# Patient Record
Sex: MALE | Race: BLACK OR AFRICAN AMERICAN | Employment: FULL TIME | URBAN - METROPOLITAN AREA
[De-identification: names, ages, dates, MRNs, and addresses within clinical notes are randomized per-mention and may not be internally consistent; named-entity substitution may affect disease eponyms.]

---

## 2017-03-28 ENCOUNTER — ED HISTORICAL/CONVERTED ENCOUNTER (OUTPATIENT)
Dept: OTHER | Age: 47
End: 2017-03-28

## 2019-04-16 ENCOUNTER — HOSPITAL ENCOUNTER (OUTPATIENT)
Dept: PREADMISSION TESTING | Age: 49
Discharge: HOME | End: 2019-04-16
Attending: ORTHOPAEDIC SURGERY
Payer: COMMERCIAL

## 2019-04-16 LAB
HCT VFR BLD AUTO: 40 % (ref 36–48)
HGB BLD-MCNC: 13.3 G/DL (ref 13–16)
POTASSIUM SERPL-SCNC: 3.8 MMOL/L (ref 3.5–5.5)

## 2019-04-16 PROCEDURE — 84132 ASSAY OF SERUM POTASSIUM: CPT

## 2019-04-16 PROCEDURE — 85018 HEMOGLOBIN: CPT

## 2019-04-16 PROCEDURE — 36415 COLL VENOUS BLD VENIPUNCTURE: CPT

## 2019-04-17 LAB
FAX TO INFO,FAXT: NORMAL
FAX TO NUMBER,FAXN: NORMAL

## 2022-06-09 ENCOUNTER — HOSPITAL ENCOUNTER (EMERGENCY)
Age: 52
Discharge: HOME | End: 2022-06-09
Attending: STUDENT IN AN ORGANIZED HEALTH CARE EDUCATION/TRAINING PROGRAM
Payer: COMMERCIAL

## 2022-06-09 VITALS
RESPIRATION RATE: 15 BRPM | HEART RATE: 92 BPM | WEIGHT: 204 LBS | SYSTOLIC BLOOD PRESSURE: 140 MMHG | TEMPERATURE: 97.7 F | BODY MASS INDEX: 29.27 KG/M2 | DIASTOLIC BLOOD PRESSURE: 95 MMHG | OXYGEN SATURATION: 96 %

## 2022-06-09 DIAGNOSIS — K02.9 PAIN DUE TO DENTAL CARIES: Primary | ICD-10-CM

## 2022-06-09 PROCEDURE — 99283 EMERGENCY DEPT VISIT LOW MDM: CPT

## 2022-06-09 RX ORDER — LIDOCAINE HYDROCHLORIDE 20 MG/ML
5 SOLUTION OROPHARYNGEAL
Qty: 30 ML | Refills: 0 | Status: SHIPPED | OUTPATIENT
Start: 2022-06-09

## 2022-06-09 RX ORDER — CLINDAMYCIN HYDROCHLORIDE 300 MG/1
300 CAPSULE ORAL 4 TIMES DAILY
Qty: 28 CAPSULE | Refills: 0 | Status: SHIPPED | OUTPATIENT
Start: 2022-06-09 | End: 2022-06-16

## 2022-06-09 NOTE — ED PROVIDER NOTES
EMERGENCY DEPARTMENT HISTORY AND PHYSICAL EXAM    Date: 6/9/2022  Patient Name: Catrachito Ricardo    History of Presenting Illness     Chief Complaint   Patient presents with    Dental Pain         History Provided By: Patient    12:35 PM  Catrachito Ricardo is a 49 y.o. male with PMHX of hypertension, CHF who presents to the emergency department C/O right upper dental pain which began a few days ago but became more severe last night and today. Patient states he has numerous cavities, does not currently have a dentist or dental insurance. Pt denies fever, dental injury or trauma, facial swelling, sore throat, neck pain, and any other sxs or complaints. PCP: Lena, MD Madhavi    Current Outpatient Medications   Medication Sig Dispense Refill    clindamycin (CLEOCIN) 300 mg capsule Take 1 Capsule by mouth four (4) times daily for 7 days. 28 Capsule 0    lidocaine (Lidocaine Viscous) 2 % solution Take 5 mL by mouth four (4) times daily as needed for Pain. Mix with equal parts water. Swish and swallow. 30 mL 0    cloNIDine (CATAPRES) 0.3 mg tablet Take 1 Tab by mouth two (2) times a day. 60 Tab 2    lisinopril (PRINIVIL, ZESTRIL) 20 mg tablet Take 1 Tab by mouth daily. 30 Tab 2    sertraline (ZOLOFT) 50 mg tablet Take 1 Tab by mouth daily. 30 Tab 0    metFORMIN (GLUCOPHAGE) 1,000 mg tablet Take 1,000 mg by mouth two (2) times daily (with meals).  MAGNESIUM OXIDE PO Take  by mouth.  hydrALAZINE (APRESOLINE) 50 mg tablet Take 1 Tab by mouth daily. 30 Tab 0    carvedilol (COREG) 25 mg tablet Take 1 Tab by mouth two (2) times daily (with meals). 60 Tab 0    furosemide (LASIX) 40 mg tablet Take 1 Tab by mouth daily. 30 Tab 0    amLODIPine (NORVASC) 10 mg tablet Take 1 Tab by mouth daily. 30 Tab 0    potassium chloride (KLOR-CON M20) 20 mEq tablet Take 1 Tab by mouth daily. 30 Tab 1    aspirin 81 mg tablet Take  by mouth two (2) times a day.          Past History     Past Medical History:  Past Medical History:   Diagnosis Date    Congestive heart failure, unspecified     chronic    Diabetes ([address]     Dyspnea on exertion     Heart failure (HCC)     Hyperglycemia     Hypertension     moderately severe    Renal insufficiency        Past Surgical History:  History reviewed. No pertinent surgical history. Family History:  Family History   Problem Relation Age of Onset    Hypertension Other         family history - nos    Heart Disease Father     Diabetes Mother        Social History:  Social History     Tobacco Use    Smoking status: Current Every Day Smoker     Packs/day: 0.25    Smokeless tobacco: Not on file   Substance Use Topics    Alcohol use: Yes     Comment: socially    Drug use: Not on file       Allergies: Allergies   Allergen Reactions    Penicillin G Unknown (comments)         Review of Systems   Review of Systems   Constitutional: Negative for fever. HENT: Positive for dental problem. Negative for sore throat. Musculoskeletal: Negative for neck pain. All other systems reviewed and are negative. Physical Exam     Vitals:    06/09/22 1137   BP: (!) 140/95   Pulse: 92   Resp: 15   Temp: 97.7 °F (36.5 °C)   SpO2: 96%   Weight: 92.5 kg (204 lb)     Physical Exam  Vital signs and nursing notes reviewed. CONSTITUTIONAL: Alert. Well-appearing; well-nourished; in no apparent distress. HEAD: Normocephalic; atraumatic. EYES: PERRL; EOM's intact. No nystagmus. Conjunctiva clear. ENT: TM's normal. External ear normal. Normal nose; no rhinorrhea. Normal pharynx. Tonsils not enlarged without exudate. Several decayed teeth, right posterior most molar has large posterior cavity as well as a missing molar. Generalized tenderness very slight lateral gum swelling, no overlying facial swelling, fluctuance or oral lesions. Floor mouth soft and not swollen. No difficulty controlling secretions. Moist mucus membranes.   NECK: Supple; FROM without difficulty, non-tender; no cervical lymphadenopathy. NEURO: A & O x3. PSYCH:  Mood and affect appropriate. Diagnostic Study Results     Labs -   No results found for this or any previous visit (from the past 12 hour(s)). Radiologic Studies -   No orders to display     CT Results  (Last 48 hours)    None        CXR Results  (Last 48 hours)    None          Medications given in the ED-  Medications - No data to display      Medical Decision Making   I am the first provider for this patient. I reviewed the vital signs, available nursing notes, past medical history, past surgical history, family history and social history. Vital Signs-Reviewed the patient's vital signs. Records Reviewed: Nursing Notes      Procedures:  Procedures    ED Course:  12:35 PM   Initial assessment performed. The patients presenting problems have been discussed, and they are in agreement with the care plan formulated and outlined with them. I have encouraged them to ask questions as they arise throughout their visit. Provider Notes (Medical Decision Making): Maya Cloud is a 49 y.o. male presents with painful dental caries and probably early infection. No abscess or other complication. Allergic to penicillin so we will treat with clindamycin, viscous lidocaine and recommend extra strength Tylenol. List of dental clinics provided for follow-up. Diagnosis and Disposition       DISCHARGE NOTE:    Bernardo Hoff  results have been reviewed with him. He has been counseled regarding his diagnosis, treatment, and plan. He verbally conveys understanding and agreement of the signs, symptoms, diagnosis, treatment and prognosis and additionally agrees to follow up as discussed. He also agrees with the care-plan and conveys that all of his questions have been answered.   I have also provided discharge instructions for him that include: educational information regarding their diagnosis and treatment, and list of reasons why they would want to return to the ED prior to their follow-up appointment, should his condition change. He has been provided with education for proper emergency department utilization. CLINICAL IMPRESSION:    1. Pain due to dental caries        PLAN:  1. D/C Home  2. Current Discharge Medication List      START taking these medications    Details   clindamycin (CLEOCIN) 300 mg capsule Take 1 Capsule by mouth four (4) times daily for 7 days. Qty: 28 Capsule, Refills: 0  Start date: 6/9/2022, End date: 6/16/2022      lidocaine (Lidocaine Viscous) 2 % solution Take 5 mL by mouth four (4) times daily as needed for Pain. Mix with equal parts water. Swish and swallow. Qty: 30 mL, Refills: 0  Start date: 6/9/2022           3. Follow-up Information     Follow up With Specialties Details Why Contact Info    See attached dental clinic list  Schedule an appointment as soon as possible for a visit       [facility] EMERGENCY DEPT Emergency Medicine  As needed, If symptoms worsen [address] [address]  [phone]        _______________________________      Please note that this dictation was completed with righTune, the computer voice recognition software. Quite often unanticipated grammatical, syntax, homophones, and other interpretive errors are inadvertently transcribed by the computer software. Please disregard these errors. Please excuse any errors that have escaped final proofreading.

## 2022-06-09 NOTE — DISCHARGE INSTRUCTIONS
[facility] [address] [place], [facility] 159  [address]:  [address] [facility], [address]  [phone]  Shahram Timmy, and Extractions    Old  [place]  [address] [facility], [address] [place]  [phone]  Shahram Timmy, and Extractions    PapiFormerly named [facility]  [address] [facility] 159  [phone]  Fillings, Cleaning, and [address] [place]  [address] [place], [address] [facility]  [phone]    KESHA ALICIA [facility] Department  [address], [address]  [phone]  Ages 3-18, if attending [facility]  [address] [facility], [address] [facility] [address] [facility] [address]  [phone]  Extractions, Yovany Lee, [facility]    [facility] of [address] [facility] [address], [address] [address] [facility] [address]  [phone]  Oral Surgery - $70 required  Eileen Kirkland, Extractions - $100 Required    [address], [address]  [phone]  [facility] Only    [address]  [place], [address] [place] [address]  Dental Clinic Open September to June

## 2024-01-08 ENCOUNTER — APPOINTMENT (OUTPATIENT)
Facility: HOSPITAL | Age: 54
DRG: 286 | End: 2024-01-08
Payer: MEDICAID

## 2024-01-08 ENCOUNTER — HOSPITAL ENCOUNTER (INPATIENT)
Facility: HOSPITAL | Age: 54
LOS: 9 days | Discharge: HOME | DRG: 286 | End: 2024-01-17
Attending: HOSPITALIST | Admitting: HOSPITALIST
Payer: MEDICAID

## 2024-01-08 DIAGNOSIS — I27.20 PULMONARY HYPERTENSION (HCC): ICD-10-CM

## 2024-01-08 DIAGNOSIS — I50.9 ACUTE CHF (CONGESTIVE HEART FAILURE) (HCC): ICD-10-CM

## 2024-01-08 DIAGNOSIS — I50.41 ACUTE COMBINED SYSTOLIC AND DIASTOLIC CHF, NYHA CLASS 1 (HCC): ICD-10-CM

## 2024-01-08 DIAGNOSIS — R06.02 SHORTNESS OF BREATH: Primary | ICD-10-CM

## 2024-01-08 DIAGNOSIS — I42.9 CARDIOMYOPATHY (HCC): ICD-10-CM

## 2024-01-08 DIAGNOSIS — I34.0 MITRAL REGURGITATION: ICD-10-CM

## 2024-01-08 DIAGNOSIS — R09.89 SUSPECTED PULMONARY HYPERTENSION: ICD-10-CM

## 2024-01-08 PROBLEM — R06.09 DOE (DYSPNEA ON EXERTION): Status: ACTIVE | Noted: 2024-01-08

## 2024-01-08 PROBLEM — Z72.0 TOBACCO USE: Status: ACTIVE | Noted: 2024-01-08

## 2024-01-08 PROBLEM — Z79.01 CHRONIC ANTICOAGULATION: Status: ACTIVE | Noted: 2024-01-08

## 2024-01-08 LAB
ALBUMIN SERPL-MCNC: 2.9 G/DL (ref 3.4–5)
ALBUMIN/GLOB SERPL: 0.9 (ref 0.8–1.7)
ALP SERPL-CCNC: 88 U/L (ref 45–117)
ALT SERPL-CCNC: 15 U/L (ref 16–61)
AMPHET UR QL SCN: NEGATIVE
ANION GAP SERPL CALC-SCNC: 8 MMOL/L (ref 3–18)
APTT PPP: 33.7 SEC (ref 23–36.4)
AST SERPL-CCNC: 11 U/L (ref 10–38)
BARBITURATES UR QL SCN: NEGATIVE
BASOPHILS # BLD: 0.1 K/UL (ref 0–0.1)
BASOPHILS NFR BLD: 1 % (ref 0–2)
BENZODIAZ UR QL: NEGATIVE
BILIRUB SERPL-MCNC: 1.2 MG/DL (ref 0.2–1)
BUN SERPL-MCNC: 21 MG/DL (ref 7–18)
BUN/CREAT SERPL: 12 (ref 12–20)
CALCIUM SERPL-MCNC: 7.9 MG/DL (ref 8.5–10.1)
CANNABINOIDS UR QL SCN: NEGATIVE
CHLORIDE SERPL-SCNC: 108 MMOL/L (ref 100–111)
CO2 SERPL-SCNC: 24 MMOL/L (ref 21–32)
COCAINE UR QL SCN: NEGATIVE
CREAT SERPL-MCNC: 1.79 MG/DL (ref 0.6–1.3)
D DIMER PPP FEU-MCNC: 0.34 UG/ML(FEU)
DIFFERENTIAL METHOD BLD: ABNORMAL
EKG ATRIAL RATE: 84 BPM
EKG ATRIAL RATE: 90 BPM
EKG DIAGNOSIS: NORMAL
EKG DIAGNOSIS: NORMAL
EKG P AXIS: 53 DEGREES
EKG P AXIS: 53 DEGREES
EKG P-R INTERVAL: 180 MS
EKG P-R INTERVAL: 180 MS
EKG Q-T INTERVAL: 402 MS
EKG Q-T INTERVAL: 410 MS
EKG QRS DURATION: 118 MS
EKG QRS DURATION: 120 MS
EKG QTC CALCULATION (BAZETT): 484 MS
EKG QTC CALCULATION (BAZETT): 491 MS
EKG R AXIS: -85 DEGREES
EKG R AXIS: 234 DEGREES
EKG T AXIS: 104 DEGREES
EKG T AXIS: 108 DEGREES
EKG VENTRICULAR RATE: 84 BPM
EKG VENTRICULAR RATE: 90 BPM
EOSINOPHIL # BLD: 0.3 K/UL (ref 0–0.4)
EOSINOPHIL NFR BLD: 3 % (ref 0–5)
ERYTHROCYTE [DISTWIDTH] IN BLOOD BY AUTOMATED COUNT: 16 % (ref 11.6–14.5)
GLOBULIN SER CALC-MCNC: 3.4 G/DL (ref 2–4)
GLUCOSE BLD STRIP.AUTO-MCNC: 102 MG/DL (ref 70–110)
GLUCOSE SERPL-MCNC: 145 MG/DL (ref 74–99)
HCT VFR BLD AUTO: 37.1 % (ref 36–48)
HGB BLD-MCNC: 11.8 G/DL (ref 13–16)
IMM GRANULOCYTES # BLD AUTO: 0 K/UL (ref 0–0.04)
IMM GRANULOCYTES NFR BLD AUTO: 0 % (ref 0–0.5)
INR PPP: 1.4 (ref 0.9–1.1)
LYMPHOCYTES # BLD: 1.2 K/UL (ref 0.9–3.6)
LYMPHOCYTES NFR BLD: 16 % (ref 21–52)
Lab: NORMAL
MCH RBC QN AUTO: 25.8 PG (ref 24–34)
MCHC RBC AUTO-ENTMCNC: 31.8 G/DL (ref 31–37)
MCV RBC AUTO: 81 FL (ref 78–100)
METHADONE UR QL: NEGATIVE
MONOCYTES # BLD: 0.7 K/UL (ref 0.05–1.2)
MONOCYTES NFR BLD: 9 % (ref 3–10)
NEUTS SEG # BLD: 5.6 K/UL (ref 1.8–8)
NEUTS SEG NFR BLD: 71 % (ref 40–73)
NRBC # BLD: 0 K/UL (ref 0–0.01)
NRBC BLD-RTO: 0 PER 100 WBC
NT PRO BNP: 2655 PG/ML (ref 0–900)
OPIATES UR QL: NEGATIVE
PCP UR QL: NEGATIVE
PLATELET # BLD AUTO: 248 K/UL (ref 135–420)
PMV BLD AUTO: 9.4 FL (ref 9.2–11.8)
POTASSIUM SERPL-SCNC: 3.3 MMOL/L (ref 3.5–5.5)
PROT SERPL-MCNC: 6.3 G/DL (ref 6.4–8.2)
PROTHROMBIN TIME: 17.1 SEC (ref 11.9–14.7)
RBC # BLD AUTO: 4.58 M/UL (ref 4.35–5.65)
SODIUM SERPL-SCNC: 140 MMOL/L (ref 136–145)
TROPONIN I SERPL HS-MCNC: 135 NG/L (ref 0–78)
TROPONIN I SERPL HS-MCNC: 139 NG/L (ref 0–78)
WBC # BLD AUTO: 7.9 K/UL (ref 4.6–13.2)

## 2024-01-08 PROCEDURE — 85610 PROTHROMBIN TIME: CPT

## 2024-01-08 PROCEDURE — 96375 TX/PRO/DX INJ NEW DRUG ADDON: CPT

## 2024-01-08 PROCEDURE — 85730 THROMBOPLASTIN TIME PARTIAL: CPT

## 2024-01-08 PROCEDURE — 80307 DRUG TEST PRSMV CHEM ANLYZR: CPT

## 2024-01-08 PROCEDURE — 83735 ASSAY OF MAGNESIUM: CPT

## 2024-01-08 PROCEDURE — 80053 COMPREHEN METABOLIC PANEL: CPT

## 2024-01-08 PROCEDURE — 6370000000 HC RX 637 (ALT 250 FOR IP): Performed by: NURSE PRACTITIONER

## 2024-01-08 PROCEDURE — 6360000002 HC RX W HCPCS: Performed by: NURSE PRACTITIONER

## 2024-01-08 PROCEDURE — 93005 ELECTROCARDIOGRAM TRACING: CPT | Performed by: NURSE PRACTITIONER

## 2024-01-08 PROCEDURE — 93005 ELECTROCARDIOGRAM TRACING: CPT | Performed by: HOSPITALIST

## 2024-01-08 PROCEDURE — 1100000000 HC RM PRIVATE

## 2024-01-08 PROCEDURE — 6360000002 HC RX W HCPCS: Performed by: HOSPITALIST

## 2024-01-08 PROCEDURE — 82962 GLUCOSE BLOOD TEST: CPT

## 2024-01-08 PROCEDURE — 84443 ASSAY THYROID STIM HORMONE: CPT

## 2024-01-08 PROCEDURE — 84484 ASSAY OF TROPONIN QUANT: CPT

## 2024-01-08 PROCEDURE — 87636 SARSCOV2 & INF A&B AMP PRB: CPT

## 2024-01-08 PROCEDURE — 85025 COMPLETE CBC W/AUTO DIFF WBC: CPT

## 2024-01-08 PROCEDURE — 6370000000 HC RX 637 (ALT 250 FOR IP): Performed by: HOSPITALIST

## 2024-01-08 PROCEDURE — 83880 ASSAY OF NATRIURETIC PEPTIDE: CPT

## 2024-01-08 PROCEDURE — 71046 X-RAY EXAM CHEST 2 VIEWS: CPT

## 2024-01-08 PROCEDURE — 85379 FIBRIN DEGRADATION QUANT: CPT

## 2024-01-08 PROCEDURE — 99285 EMERGENCY DEPT VISIT HI MDM: CPT

## 2024-01-08 PROCEDURE — A9540 TC99M MAA: HCPCS | Performed by: NURSE PRACTITIONER

## 2024-01-08 PROCEDURE — 3430000000 HC RX DIAGNOSTIC RADIOPHARMACEUTICAL: Performed by: NURSE PRACTITIONER

## 2024-01-08 PROCEDURE — 96374 THER/PROPH/DIAG INJ IV PUSH: CPT

## 2024-01-08 RX ORDER — HEPARIN SODIUM 10000 [USP'U]/100ML
5-30 INJECTION, SOLUTION INTRAVENOUS CONTINUOUS
Status: DISCONTINUED | OUTPATIENT
Start: 2024-01-08 | End: 2024-01-17 | Stop reason: HOSPADM

## 2024-01-08 RX ORDER — INSULIN LISPRO 100 [IU]/ML
0-4 INJECTION, SOLUTION INTRAVENOUS; SUBCUTANEOUS NIGHTLY
Status: DISCONTINUED | OUTPATIENT
Start: 2024-01-08 | End: 2024-01-17 | Stop reason: HOSPADM

## 2024-01-08 RX ORDER — FUROSEMIDE 10 MG/ML
20 INJECTION INTRAMUSCULAR; INTRAVENOUS 2 TIMES DAILY
Status: DISCONTINUED | OUTPATIENT
Start: 2024-01-08 | End: 2024-01-08

## 2024-01-08 RX ORDER — POTASSIUM CHLORIDE 20 MEQ/1
40 TABLET, EXTENDED RELEASE ORAL
Status: COMPLETED | OUTPATIENT
Start: 2024-01-08 | End: 2024-01-08

## 2024-01-08 RX ORDER — ACETAMINOPHEN 325 MG/1
650 TABLET ORAL EVERY 4 HOURS PRN
Status: DISCONTINUED | OUTPATIENT
Start: 2024-01-08 | End: 2024-01-17 | Stop reason: HOSPADM

## 2024-01-08 RX ORDER — HEPARIN SODIUM 1000 [USP'U]/ML
4000 INJECTION, SOLUTION INTRAVENOUS; SUBCUTANEOUS ONCE
Status: COMPLETED | OUTPATIENT
Start: 2024-01-08 | End: 2024-01-08

## 2024-01-08 RX ORDER — WARFARIN SODIUM 7.5 MG/1
7.5 TABLET ORAL
Status: COMPLETED | OUTPATIENT
Start: 2024-01-08 | End: 2024-01-08

## 2024-01-08 RX ORDER — FUROSEMIDE 10 MG/ML
20 INJECTION INTRAMUSCULAR; INTRAVENOUS DAILY
Status: DISCONTINUED | OUTPATIENT
Start: 2024-01-08 | End: 2024-01-09

## 2024-01-08 RX ORDER — HEPARIN SODIUM 1000 [USP'U]/ML
2000 INJECTION, SOLUTION INTRAVENOUS; SUBCUTANEOUS PRN
Status: DISCONTINUED | OUTPATIENT
Start: 2024-01-08 | End: 2024-01-17 | Stop reason: HOSPADM

## 2024-01-08 RX ORDER — CLONIDINE HYDROCHLORIDE 0.1 MG/1
0.3 TABLET ORAL 2 TIMES DAILY
Status: DISCONTINUED | OUTPATIENT
Start: 2024-01-08 | End: 2024-01-17

## 2024-01-08 RX ORDER — HYDRALAZINE HYDROCHLORIDE 50 MG/1
50 TABLET, FILM COATED ORAL DAILY
Status: DISCONTINUED | OUTPATIENT
Start: 2024-01-08 | End: 2024-01-11

## 2024-01-08 RX ORDER — POTASSIUM CHLORIDE 20 MEQ/1
20 TABLET, EXTENDED RELEASE ORAL DAILY
Status: DISCONTINUED | OUTPATIENT
Start: 2024-01-08 | End: 2024-01-17 | Stop reason: HOSPADM

## 2024-01-08 RX ORDER — CARVEDILOL 12.5 MG/1
25 TABLET ORAL 2 TIMES DAILY WITH MEALS
Status: DISCONTINUED | OUTPATIENT
Start: 2024-01-08 | End: 2024-01-17 | Stop reason: HOSPADM

## 2024-01-08 RX ORDER — INSULIN LISPRO 100 [IU]/ML
0-8 INJECTION, SOLUTION INTRAVENOUS; SUBCUTANEOUS
Status: DISCONTINUED | OUTPATIENT
Start: 2024-01-08 | End: 2024-01-17 | Stop reason: HOSPADM

## 2024-01-08 RX ORDER — HEPARIN SODIUM 1000 [USP'U]/ML
4000 INJECTION, SOLUTION INTRAVENOUS; SUBCUTANEOUS PRN
Status: DISCONTINUED | OUTPATIENT
Start: 2024-01-08 | End: 2024-01-17 | Stop reason: HOSPADM

## 2024-01-08 RX ORDER — AMLODIPINE BESYLATE 5 MG/1
10 TABLET ORAL DAILY
Status: DISCONTINUED | OUTPATIENT
Start: 2024-01-08 | End: 2024-01-17

## 2024-01-08 RX ORDER — ONDANSETRON 2 MG/ML
4 INJECTION INTRAMUSCULAR; INTRAVENOUS EVERY 6 HOURS PRN
Status: DISCONTINUED | OUTPATIENT
Start: 2024-01-08 | End: 2024-01-17 | Stop reason: HOSPADM

## 2024-01-08 RX ADMIN — HEPARIN SODIUM 4000 UNITS: 1000 INJECTION INTRAVENOUS; SUBCUTANEOUS at 22:59

## 2024-01-08 RX ADMIN — POTASSIUM CHLORIDE 20 MEQ: 1500 TABLET, EXTENDED RELEASE ORAL at 19:26

## 2024-01-08 RX ADMIN — POTASSIUM CHLORIDE 40 MEQ: 1500 TABLET, EXTENDED RELEASE ORAL at 16:47

## 2024-01-08 RX ADMIN — CARVEDILOL 25 MG: 12.5 TABLET, FILM COATED ORAL at 19:27

## 2024-01-08 RX ADMIN — KIT FOR THE PREPARATION OF TECHNETIUM TC 99M ALBUMIN AGGREGATED 6.6 MILLICURIE: 2.5 INJECTION, POWDER, FOR SOLUTION INTRAVENOUS at 18:09

## 2024-01-08 RX ADMIN — AMLODIPINE BESYLATE 10 MG: 5 TABLET ORAL at 19:26

## 2024-01-08 RX ADMIN — HEPARIN SODIUM 10 UNITS/KG/HR: 10000 INJECTION, SOLUTION INTRAVENOUS at 18:45

## 2024-01-08 RX ADMIN — SERTRALINE HYDROCHLORIDE 50 MG: 50 TABLET ORAL at 19:27

## 2024-01-08 RX ADMIN — WARFARIN SODIUM 7.5 MG: 7.5 TABLET ORAL at 23:29

## 2024-01-08 RX ADMIN — FUROSEMIDE 20 MG: 10 INJECTION, SOLUTION INTRAMUSCULAR; INTRAVENOUS at 19:26

## 2024-01-08 RX ADMIN — HEPARIN SODIUM 4000 UNITS: 1000 INJECTION INTRAVENOUS; SUBCUTANEOUS at 18:45

## 2024-01-08 RX ADMIN — CLONIDINE HYDROCHLORIDE 0.3 MG: 0.1 TABLET ORAL at 23:29

## 2024-01-08 NOTE — H&P (VIEW-ONLY)
antihypertensive treatment  Check urine for toxicology screen  Management plan was discussed in detail with the patient and wife.  Thank you for allowing me to participate in the management of this pleasant patient.  Please feel free to contact me if you have any questions or concerns.          Signed By: BELINDA BARRETT MD     January 8, 2024

## 2024-01-08 NOTE — CONSULTS
[facility]      Patient: Benjamin Alcocer MRN: [id number]  SSN: [id number]    YOB: 1970  Age: 53 y.o.  Sex: male    Date of Consultation: 1/8/2024    Reason for Consultation: Shortness of breath and elevated troponin    HPI:  I was asked by Oj Calvo RN  to see this pleasant patient for shortness of breath.  Benjamin Alcocer is a 53-year-old gentleman with history of hypertensive heart disease, chronic systolic heart failure, LVH, history of LV thrombus Coumadin, hypertension, chronic renal insufficiency came to the hospital for symptoms of progressively worsening of shortness of breath from last couple of months. Patient is found to have elevated troponin and INR 1.4.     Patient denied any history of drug abuse.    According to the patient in the past he has sleep study done and that was negative   No symptoms of chest pain   No symptoms of orthopnea PND or ankle swelling   Blood pressure is suboptimally controlled   INR is suboptimal as well.  According to the patient he was on Jardiance and his insurance was not covering.             Past Medical History:   Diagnosis Date    Congestive heart failure, unspecified     chronic    Diabetes (HCC)     Dyspnea on exertion     Heart failure (HCC)     Hyperglycemia     Hypertension     moderately severe    Renal insufficiency      History reviewed. No pertinent surgical history.  Current Facility-Administered Medications   Medication Dose Route Frequency    heparin (porcine) injection 4,000 Units  4,000 Units IntraVENous Once    heparin (porcine) injection 4,000 Units  4,000 Units IntraVENous PRN    heparin (porcine) injection 2,000 Units  2,000 Units IntraVENous PRN    heparin 25,000 units in dextrose 5% 250 mL (premix) infusion  5-30 Units/kg/hr IntraVENous Continuous     Current Outpatient Medications   Medication Sig    MAGNESIUM OXIDE PO Take by mouth    amLODIPine (NORVASC) 10 MG tablet Take 10 mg by mouth daily    carvedilol (COREG) 25 MG

## 2024-01-08 NOTE — ED PROVIDER NOTES
[facility] EMERGENCY DEPT  EMERGENCY DEPARTMENT ENCOUNTER       Pt Name: Benjamin Alcocer  MRN: [id number]  Birthdate 1970  Date of evaluation: 1/8/2024  PCP: Kristofer Delgado DO  Note Started: 6:23 PM 1/8/24     CHIEF COMPLAINT       Chief Complaint   Patient presents with    Shortness of Breath        HISTORY OF PRESENT ILLNESS: 1 or more elements      History From: Patient  HPI Limitations: None  Chronic Conditions: Hypertension, CHF, diabetes, anticoagulated on Coumadin    Benjamin Alcocer is a 53 y.o. male who presents to ED c/o worsening shortness of breath over the last 2 months.  States that shortness of breath is intermittent.  It is significantly worse when he exerts himself.  He also complains of a mild cough that is nonproductive.      He denies any fever or chills, denies chest pain, denies leg swelling.    He is anticoagulated on Coumadin for a blood clot that he states was in his heart a few years back.  States he last saw his primary care physician 6 months ago.    Denies any sick contacts.  He is concerned that he is having worsening heart failure.  He does state that he is compliant with his medications.     Nursing Notes were all reviewed and agreed with or any disagreements were addressed in the HPI.      PHYSICAL EXAM      Vitals:    01/08/24 1252   BP: (!) 168/100   Pulse: 92   Resp: 22   Temp: 97.3 °F (36.3 °C)   SpO2: 98%   Weight: 91.2 kg (201 lb)   Height: 1.778 m (5' 10\")     Physical Exam  Constitutional:       Appearance: He is well-developed and normal weight.   Cardiovascular:      Rate and Rhythm: Normal rate and regular rhythm.   Pulmonary:      Effort: Pulmonary effort is normal.      Breath sounds: Normal breath sounds.   Abdominal:      General: Bowel sounds are normal.      Palpations: Abdomen is soft.   Musculoskeletal:         General: Normal range of motion.   Skin:     General: Skin is warm and dry.   Neurological:      Mental Status: He is alert.              EMERGENCY DEPARTMENT

## 2024-01-08 NOTE — ED TRIAGE NOTES
Difficulty breathing since November worse with exertion, Hx CHF, denies CP, Denies any nausea or vomiting Hx HTN took hi meds today  
21-Jul-2022 22:00

## 2024-01-09 ENCOUNTER — APPOINTMENT (OUTPATIENT)
Facility: HOSPITAL | Age: 54
DRG: 286 | End: 2024-01-09
Payer: MEDICAID

## 2024-01-09 LAB
ANION GAP SERPL CALC-SCNC: 7 MMOL/L (ref 3–18)
APTT PPP: 45.3 SEC (ref 23–36.4)
APTT PPP: 67.9 SEC (ref 23–36.4)
APTT PPP: 71.2 SEC (ref 23–36.4)
APTT PPP: 72.8 SEC (ref 23–36.4)
BASOPHILS # BLD: 0 K/UL (ref 0–0.1)
BASOPHILS NFR BLD: 1 % (ref 0–2)
BUN SERPL-MCNC: 22 MG/DL (ref 7–18)
BUN/CREAT SERPL: 13 (ref 12–20)
CALCIUM SERPL-MCNC: 8.2 MG/DL (ref 8.5–10.1)
CHLORIDE SERPL-SCNC: 110 MMOL/L (ref 100–111)
CO2 SERPL-SCNC: 24 MMOL/L (ref 21–32)
CREAT SERPL-MCNC: 1.64 MG/DL (ref 0.6–1.3)
DIFFERENTIAL METHOD BLD: ABNORMAL
ECHO AO ASC DIAM: 3.8 CM
ECHO AO ASCENDING AORTA INDEX: 1.82 CM/M2
ECHO AO ROOT DIAM: 3.4 CM
ECHO AO ROOT INDEX: 1.63 CM/M2
ECHO AV AREA PEAK VELOCITY: 2.8 CM2
ECHO AV AREA VTI: 2.9 CM2
ECHO AV AREA/BSA PEAK VELOCITY: 1.3 CM2/M2
ECHO AV AREA/BSA VTI: 1.4 CM2/M2
ECHO AV MEAN GRADIENT: 3 MMHG
ECHO AV MEAN VELOCITY: 0.8 M/S
ECHO AV PEAK GRADIENT: 6 MMHG
ECHO AV PEAK VELOCITY: 1.2 M/S
ECHO AV VELOCITY RATIO: 0.67
ECHO AV VTI: 18.9 CM
ECHO BSA: 2.12 M2
ECHO EST RA PRESSURE: 15 MMHG
ECHO IVC PROX: 3.1 CM
ECHO LA DIAMETER INDEX: 2.68 CM/M2
ECHO LA DIAMETER: 5.6 CM
ECHO LA TO AORTIC ROOT RATIO: 1.65
ECHO LA VOL A-L A2C: 105 ML (ref 18–58)
ECHO LA VOL A-L A4C: 95 ML (ref 18–58)
ECHO LA VOL BP: 103 ML (ref 18–58)
ECHO LA VOL MOD A2C: 102 ML (ref 18–58)
ECHO LA VOL MOD A4C: 93 ML (ref 18–58)
ECHO LA VOL/BSA BIPLANE: 49 ML/M2 (ref 16–34)
ECHO LA VOLUME AREA LENGTH: 106 ML
ECHO LA VOLUME INDEX A-L A2C: 50 ML/M2 (ref 16–34)
ECHO LA VOLUME INDEX A-L A4C: 45 ML/M2 (ref 16–34)
ECHO LA VOLUME INDEX AREA LENGTH: 51 ML/M2 (ref 16–34)
ECHO LA VOLUME INDEX MOD A2C: 49 ML/M2 (ref 16–34)
ECHO LA VOLUME INDEX MOD A4C: 44 ML/M2 (ref 16–34)
ECHO LV E' LATERAL VELOCITY: 6 CM/S
ECHO LV E' SEPTAL VELOCITY: 6 CM/S
ECHO LV EDV A2C: 277 ML
ECHO LV EDV A4C: 225 ML
ECHO LV EDV BP: 262 ML (ref 67–155)
ECHO LV EDV INDEX A4C: 108 ML/M2
ECHO LV EDV INDEX BP: 125 ML/M2
ECHO LV EDV NDEX A2C: 133 ML/M2
ECHO LV EJECTION FRACTION A2C: 17 %
ECHO LV EJECTION FRACTION A4C: 24 %
ECHO LV EJECTION FRACTION BIPLANE: 20 % (ref 55–100)
ECHO LV ESV A2C: 228 ML
ECHO LV ESV A4C: 170 ML
ECHO LV ESV BP: 209 ML (ref 22–58)
ECHO LV ESV INDEX A2C: 109 ML/M2
ECHO LV ESV INDEX A4C: 81 ML/M2
ECHO LV ESV INDEX BP: 100 ML/M2
ECHO LV FRACTIONAL SHORTENING: 7 % (ref 28–44)
ECHO LV INTERNAL DIMENSION DIASTOLE INDEX: 2.82 CM/M2
ECHO LV INTERNAL DIMENSION DIASTOLIC: 5.9 CM (ref 4.2–5.9)
ECHO LV INTERNAL DIMENSION SYSTOLIC INDEX: 2.63 CM/M2
ECHO LV INTERNAL DIMENSION SYSTOLIC: 5.5 CM
ECHO LV IVSD: 1.6 CM (ref 0.6–1)
ECHO LV MASS 2D: 477.6 G (ref 88–224)
ECHO LV MASS INDEX 2D: 228.5 G/M2 (ref 49–115)
ECHO LV POSTERIOR WALL DIASTOLIC: 1.7 CM (ref 0.6–1)
ECHO LV RELATIVE WALL THICKNESS RATIO: 0.58
ECHO LVOT AREA: 4.5 CM2
ECHO LVOT AV VTI INDEX: 0.67
ECHO LVOT DIAM: 2.4 CM
ECHO LVOT MEAN GRADIENT: 1 MMHG
ECHO LVOT PEAK GRADIENT: 2 MMHG
ECHO LVOT PEAK VELOCITY: 0.8 M/S
ECHO LVOT STROKE VOLUME INDEX: 27.5 ML/M2
ECHO LVOT SV: 57.4 ML
ECHO LVOT VTI: 12.7 CM
ECHO MV A VELOCITY: 0.32 M/S
ECHO MV AREA VTI: 2.7 CM2
ECHO MV E DECELERATION TIME (DT): 151.4 MS
ECHO MV E VELOCITY: 0.88 M/S
ECHO MV E/A RATIO: 2.75
ECHO MV E/E' LATERAL: 14.67
ECHO MV E/E' RATIO (AVERAGED): 14.67
ECHO MV LVOT VTI INDEX: 1.66
ECHO MV MAX VELOCITY: 1.1 M/S
ECHO MV MEAN GRADIENT: 2 MMHG
ECHO MV MEAN VELOCITY: 0.6 M/S
ECHO MV PEAK GRADIENT: 5 MMHG
ECHO MV VTI: 21.1 CM
ECHO PULMONARY ARTERY END DIASTOLIC PRESSURE: 27 MMHG
ECHO PV MAX VELOCITY: 1 M/S
ECHO PV PEAK GRADIENT: 4 MMHG
ECHO PV REGURGITANT MAX VELOCITY: 2.6 M/S
ECHO RA END SYSTOLIC VOLUME APICAL 4 CHAMBER INDEX BSA: 50 ML/M2
ECHO RA VOLUME: 104 ML
ECHO RIGHT VENTRICULAR SYSTOLIC PRESSURE (RVSP): 75 MMHG
ECHO RV FREE WALL PEAK S': 10 CM/S
ECHO RV INTERNAL DIMENSION: 4.2 CM
ECHO RV TAPSE: 1.2 CM (ref 1.7–?)
ECHO TV REGURGITANT MAX VELOCITY: 3.87 M/S
ECHO TV REGURGITANT PEAK GRADIENT: 60 MMHG
EOSINOPHIL # BLD: 0.2 K/UL (ref 0–0.4)
EOSINOPHIL NFR BLD: 4 % (ref 0–5)
ERYTHROCYTE [DISTWIDTH] IN BLOOD BY AUTOMATED COUNT: 15.9 % (ref 11.6–14.5)
FLUAV RNA SPEC QL NAA+PROBE: NOT DETECTED
FLUBV RNA SPEC QL NAA+PROBE: NOT DETECTED
GLUCOSE BLD STRIP.AUTO-MCNC: 120 MG/DL (ref 70–110)
GLUCOSE BLD STRIP.AUTO-MCNC: 134 MG/DL (ref 70–110)
GLUCOSE BLD STRIP.AUTO-MCNC: 181 MG/DL (ref 70–110)
GLUCOSE BLD STRIP.AUTO-MCNC: 186 MG/DL (ref 70–110)
GLUCOSE SERPL-MCNC: 136 MG/DL (ref 74–99)
HCT VFR BLD AUTO: 35.1 % (ref 36–48)
HGB BLD-MCNC: 11.2 G/DL (ref 13–16)
IMM GRANULOCYTES # BLD AUTO: 0 K/UL (ref 0–0.04)
IMM GRANULOCYTES NFR BLD AUTO: 0 % (ref 0–0.5)
INR PPP: 1.4 (ref 0.9–1.1)
LYMPHOCYTES # BLD: 1 K/UL (ref 0.9–3.6)
LYMPHOCYTES NFR BLD: 19 % (ref 21–52)
MAGNESIUM SERPL-MCNC: 1.4 MG/DL (ref 1.6–2.6)
MCH RBC QN AUTO: 25.7 PG (ref 24–34)
MCHC RBC AUTO-ENTMCNC: 31.9 G/DL (ref 31–37)
MCV RBC AUTO: 80.7 FL (ref 78–100)
MONOCYTES # BLD: 0.6 K/UL (ref 0.05–1.2)
MONOCYTES NFR BLD: 10 % (ref 3–10)
NEUTS SEG # BLD: 3.7 K/UL (ref 1.8–8)
NEUTS SEG NFR BLD: 67 % (ref 40–73)
NRBC # BLD: 0 K/UL (ref 0–0.01)
NRBC BLD-RTO: 0 PER 100 WBC
PLATELET # BLD AUTO: 233 K/UL (ref 135–420)
PMV BLD AUTO: 10.1 FL (ref 9.2–11.8)
POTASSIUM SERPL-SCNC: 3.6 MMOL/L (ref 3.5–5.5)
PROTHROMBIN TIME: 17.6 SEC (ref 11.9–14.7)
RBC # BLD AUTO: 4.35 M/UL (ref 4.35–5.65)
SARS-COV-2 RNA RESP QL NAA+PROBE: NOT DETECTED
SODIUM SERPL-SCNC: 141 MMOL/L (ref 136–145)
TROPONIN I SERPL HS-MCNC: 117 NG/L (ref 0–78)
TROPONIN I SERPL HS-MCNC: 127 NG/L (ref 0–78)
TROPONIN I SERPL HS-MCNC: 91 NG/L (ref 0–78)
TROPONIN I SERPL HS-MCNC: 93 NG/L (ref 0–78)
TSH SERPL DL<=0.05 MIU/L-ACNC: 5.95 UIU/ML (ref 0.36–3.74)
WBC # BLD AUTO: 5.6 K/UL (ref 4.6–13.2)

## 2024-01-09 PROCEDURE — 6360000004 HC RX CONTRAST MEDICATION: Performed by: HOSPITALIST

## 2024-01-09 PROCEDURE — 36415 COLL VENOUS BLD VENIPUNCTURE: CPT

## 2024-01-09 PROCEDURE — 85025 COMPLETE CBC W/AUTO DIFF WBC: CPT

## 2024-01-09 PROCEDURE — 6370000000 HC RX 637 (ALT 250 FOR IP): Performed by: HOSPITALIST

## 2024-01-09 PROCEDURE — 6360000002 HC RX W HCPCS: Performed by: NURSE PRACTITIONER

## 2024-01-09 PROCEDURE — C8929 TTE W OR WO FOL WCON,DOPPLER: HCPCS

## 2024-01-09 PROCEDURE — 6360000002 HC RX W HCPCS: Performed by: HOSPITALIST

## 2024-01-09 PROCEDURE — 1100000003 HC PRIVATE W/ TELEMETRY

## 2024-01-09 PROCEDURE — 85730 THROMBOPLASTIN TIME PARTIAL: CPT

## 2024-01-09 PROCEDURE — 80048 BASIC METABOLIC PNL TOTAL CA: CPT

## 2024-01-09 PROCEDURE — 82962 GLUCOSE BLOOD TEST: CPT

## 2024-01-09 PROCEDURE — 85610 PROTHROMBIN TIME: CPT

## 2024-01-09 PROCEDURE — 84484 ASSAY OF TROPONIN QUANT: CPT

## 2024-01-09 RX ORDER — WARFARIN SODIUM 7.5 MG/1
7.5 TABLET ORAL
Status: COMPLETED | OUTPATIENT
Start: 2024-01-09 | End: 2024-01-09

## 2024-01-09 RX ORDER — DEXTROSE MONOHYDRATE 100 MG/ML
INJECTION, SOLUTION INTRAVENOUS CONTINUOUS PRN
Status: DISCONTINUED | OUTPATIENT
Start: 2024-01-09 | End: 2024-01-17 | Stop reason: HOSPADM

## 2024-01-09 RX ORDER — FUROSEMIDE 10 MG/ML
40 INJECTION INTRAMUSCULAR; INTRAVENOUS DAILY
Status: DISCONTINUED | OUTPATIENT
Start: 2024-01-10 | End: 2024-01-10

## 2024-01-09 RX ORDER — ROSUVASTATIN CALCIUM 40 MG/1
40 TABLET, COATED ORAL DAILY
COMMUNITY

## 2024-01-09 RX ORDER — ISOSORBIDE MONONITRATE 60 MG/1
60 TABLET, EXTENDED RELEASE ORAL EVERY 12 HOURS SCHEDULED
Status: ON HOLD | COMMUNITY
Start: 2024-01-02 | End: 2024-01-17 | Stop reason: HOSPADM

## 2024-01-09 RX ORDER — MAGNESIUM SULFATE IN WATER 40 MG/ML
2000 INJECTION, SOLUTION INTRAVENOUS ONCE
Status: COMPLETED | OUTPATIENT
Start: 2024-01-09 | End: 2024-01-09

## 2024-01-09 RX ADMIN — CLONIDINE HYDROCHLORIDE 0.3 MG: 0.1 TABLET ORAL at 08:36

## 2024-01-09 RX ADMIN — CARVEDILOL 25 MG: 12.5 TABLET, FILM COATED ORAL at 08:36

## 2024-01-09 RX ADMIN — CLONIDINE HYDROCHLORIDE 0.3 MG: 0.1 TABLET ORAL at 21:34

## 2024-01-09 RX ADMIN — HEPARIN SODIUM 2000 UNITS: 1000 INJECTION INTRAVENOUS; SUBCUTANEOUS at 13:40

## 2024-01-09 RX ADMIN — HEPARIN SODIUM 15 UNITS/KG/HR: 10000 INJECTION, SOLUTION INTRAVENOUS at 13:41

## 2024-01-09 RX ADMIN — WARFARIN SODIUM 7.5 MG: 7.5 TABLET ORAL at 17:41

## 2024-01-09 RX ADMIN — HEPARIN SODIUM 2000 UNITS: 1000 INJECTION INTRAVENOUS; SUBCUTANEOUS at 20:06

## 2024-01-09 RX ADMIN — PERFLUTREN 1.5 ML: 6.52 INJECTION, SUSPENSION INTRAVENOUS at 09:38

## 2024-01-09 RX ADMIN — SERTRALINE HYDROCHLORIDE 50 MG: 50 TABLET ORAL at 08:36

## 2024-01-09 RX ADMIN — FUROSEMIDE 20 MG: 10 INJECTION, SOLUTION INTRAMUSCULAR; INTRAVENOUS at 08:41

## 2024-01-09 RX ADMIN — HEPARIN SODIUM 4000 UNITS: 1000 INJECTION INTRAVENOUS; SUBCUTANEOUS at 06:40

## 2024-01-09 RX ADMIN — MAGNESIUM SULFATE HEPTAHYDRATE 2000 MG: 40 INJECTION, SOLUTION INTRAVENOUS at 10:02

## 2024-01-09 RX ADMIN — CARVEDILOL 25 MG: 12.5 TABLET, FILM COATED ORAL at 17:41

## 2024-01-09 RX ADMIN — AMLODIPINE BESYLATE 10 MG: 5 TABLET ORAL at 08:35

## 2024-01-09 RX ADMIN — POTASSIUM CHLORIDE 20 MEQ: 1500 TABLET, EXTENDED RELEASE ORAL at 08:41

## 2024-01-09 ASSESSMENT — PAIN SCALES - GENERAL: PAINLEVEL_OUTOF10: 0

## 2024-01-09 NOTE — CARE COORDINATION
Case Management Assessment  Initial Evaluation    Date/Time of Evaluation: 1/9/2024 3:46 PM  Assessment Completed by: Gutierrez Hernandez RN    If patient is discharged prior to next notation, then this note serves as note for discharge by case management.    Patient Name: Benjamin Alcocer                   YOB: 1970  Diagnosis: Shortness of breath [R06.02]  Cardiomyopathy (HCC) [I42.9]                   Date / Time: 1/8/2024  2:47 PM    Patient Admission Status: Inpatient   Readmission Risk (Low < 19, Mod (19-27), High > 27): Readmission Risk Score: 13.2    Current PCP: Kristofer Delgado, DO  PCP verified by CM? Yes    Chart Reviewed: Yes      History Provided by: Patient  Patient Orientation: Alert and Oriented    Patient Cognition: Alert    Hospitalization in the last 30 days (Readmission):  No    If yes, Readmission Assessment in CM Navigator will be completed.    Advance Directives:      Code Status: Full Code   Patient's Primary Decision Maker is:        Discharge Planning:    Patient lives with: Spouse/Significant Other Type of Home: House  Primary Care Giver: Self  Patient Support Systems include: Spouse/Significant Other, Family Members   Current Financial resources: None  Current community resources: None  Current services prior to admission: None            Current DME:              Type of Home Care services:  None    ADLS  Prior functional level: Independent in ADLs/IADLs  Current functional level: Independent in ADLs/IADLs    PT AM-PAC:   /24  OT AM-PAC:   /24    Family can provide assistance at DC: Yes  Would you like Case Management to discuss the discharge plan with any other family members/significant others, and if so, who?    Plans to Return to Present Housing: Yes  Other Identified Issues/Barriers to RETURNING to current housing: None  Potential Assistance needed at discharge: N/A            Potential DME:    Patient expects to discharge to: House  Plan for transportation at discharge:

## 2024-01-09 NOTE — H&P
which has been repleted.  Troponin 135, repeat is 139.  LFTs are unremarkable.  Glucose is 145, hemoglobin is 11.8, white count and platelet count are normal.  INR is 1.4.  Chest x-ray shows cardiomegaly and atelectasis.  VQ scan result is still pending.    ASSESSMENT:  1.  Exertional dyspnea.  2.  Cardiomyopathy, chronic.  3.  Chronic kidney disease.  4.  Diabetes.  5.  Medical noncompliance.  6.  Anticoagulation, chronic.  7.  History of left ventricular thrombus.  8.  Diabetes mellitus.    PLAN:  Resume his home meds to include Norvasc, Coreg, Catapres, daily Lasix, hydralazine, I am going to put on hold for now to see how his blood pressure response because I am not sure he has been taking all his regular medications.  We will put him on Accu-Cheks before meals and at bedtime with sliding scale as needed, daily potassium repletion.  Continue his Zoloft.  Heparin drip has been ordered.  A VQ scan will be followed up.  Routine electrolytes in the morning.  Repeat his TSH level.  Echocardiogram tomorrow morning.  Further evaluation depending on those results as above, and a low-sodium diet in the meantime.  He is stable for admission to the telemetry unit for cardiac monitoring.  He understands the plan of care.  He is a Full Code.  Expected length of stay 3 days.      YVAN ESTRADA MD      [id number]  D:  01/08/2024 18:52  T:  01/08/2024 19:20  JOB #:  [id number]

## 2024-01-10 PROBLEM — I50.23 ACUTE ON CHRONIC SYSTOLIC (CONGESTIVE) HEART FAILURE (HCC): Status: ACTIVE | Noted: 2024-01-10

## 2024-01-10 PROBLEM — N18.30 CKD (CHRONIC KIDNEY DISEASE) STAGE 3, GFR 30-59 ML/MIN (HCC): Status: ACTIVE | Noted: 2024-01-10

## 2024-01-10 LAB
ANION GAP SERPL CALC-SCNC: 6 MMOL/L (ref 3–18)
APTT PPP: 75.7 SEC (ref 23–36.4)
APTT PPP: 80.1 SEC (ref 23–36.4)
BASOPHILS # BLD: 0 K/UL (ref 0–0.1)
BASOPHILS NFR BLD: 1 % (ref 0–2)
BUN SERPL-MCNC: 26 MG/DL (ref 7–18)
BUN/CREAT SERPL: 13 (ref 12–20)
CALCIUM SERPL-MCNC: 8.4 MG/DL (ref 8.5–10.1)
CHLORIDE SERPL-SCNC: 108 MMOL/L (ref 100–111)
CHOLEST SERPL-MCNC: 98 MG/DL
CO2 SERPL-SCNC: 25 MMOL/L (ref 21–32)
CREAT SERPL-MCNC: 1.94 MG/DL (ref 0.6–1.3)
DIFFERENTIAL METHOD BLD: ABNORMAL
EOSINOPHIL # BLD: 0.2 K/UL (ref 0–0.4)
EOSINOPHIL NFR BLD: 4 % (ref 0–5)
ERYTHROCYTE [DISTWIDTH] IN BLOOD BY AUTOMATED COUNT: 16 % (ref 11.6–14.5)
GLUCOSE BLD STRIP.AUTO-MCNC: 147 MG/DL (ref 70–110)
GLUCOSE BLD STRIP.AUTO-MCNC: 152 MG/DL (ref 70–110)
GLUCOSE BLD STRIP.AUTO-MCNC: 209 MG/DL (ref 70–110)
GLUCOSE BLD STRIP.AUTO-MCNC: 259 MG/DL (ref 70–110)
GLUCOSE SERPL-MCNC: 138 MG/DL (ref 74–99)
HBA1C MFR BLD: 6.4 % (ref 4.2–5.6)
HCT VFR BLD AUTO: 35.1 % (ref 36–48)
HDLC SERPL-MCNC: 33 MG/DL (ref 40–60)
HDLC SERPL: 3 (ref 0–5)
HGB BLD-MCNC: 11.3 G/DL (ref 13–16)
IMM GRANULOCYTES # BLD AUTO: 0.1 K/UL (ref 0–0.04)
IMM GRANULOCYTES NFR BLD AUTO: 1 % (ref 0–0.5)
INR PPP: 1.7 (ref 0.9–1.1)
LDLC SERPL CALC-MCNC: 49.4 MG/DL (ref 0–100)
LIPID PANEL: ABNORMAL
LYMPHOCYTES # BLD: 1.3 K/UL (ref 0.9–3.6)
LYMPHOCYTES NFR BLD: 23 % (ref 21–52)
MCH RBC QN AUTO: 25.8 PG (ref 24–34)
MCHC RBC AUTO-ENTMCNC: 32.2 G/DL (ref 31–37)
MCV RBC AUTO: 80.1 FL (ref 78–100)
MONOCYTES # BLD: 0.6 K/UL (ref 0.05–1.2)
MONOCYTES NFR BLD: 10 % (ref 3–10)
NEUTS SEG # BLD: 3.3 K/UL (ref 1.8–8)
NEUTS SEG NFR BLD: 61 % (ref 40–73)
NRBC # BLD: 0 K/UL (ref 0–0.01)
NRBC BLD-RTO: 0 PER 100 WBC
PLATELET # BLD AUTO: 223 K/UL (ref 135–420)
PMV BLD AUTO: 9.7 FL (ref 9.2–11.8)
POTASSIUM SERPL-SCNC: 3.6 MMOL/L (ref 3.5–5.5)
PROTHROMBIN TIME: 19.9 SEC (ref 11.9–14.7)
RBC # BLD AUTO: 4.38 M/UL (ref 4.35–5.65)
SODIUM SERPL-SCNC: 139 MMOL/L (ref 136–145)
TRIGL SERPL-MCNC: 78 MG/DL
VLDLC SERPL CALC-MCNC: 15.6 MG/DL
WBC # BLD AUTO: 5.5 K/UL (ref 4.6–13.2)

## 2024-01-10 PROCEDURE — 85610 PROTHROMBIN TIME: CPT

## 2024-01-10 PROCEDURE — 6370000000 HC RX 637 (ALT 250 FOR IP): Performed by: HOSPITALIST

## 2024-01-10 PROCEDURE — 82962 GLUCOSE BLOOD TEST: CPT

## 2024-01-10 PROCEDURE — 1100000003 HC PRIVATE W/ TELEMETRY

## 2024-01-10 PROCEDURE — 80061 LIPID PANEL: CPT

## 2024-01-10 PROCEDURE — 85025 COMPLETE CBC W/AUTO DIFF WBC: CPT

## 2024-01-10 PROCEDURE — 85730 THROMBOPLASTIN TIME PARTIAL: CPT

## 2024-01-10 PROCEDURE — 6360000002 HC RX W HCPCS: Performed by: INTERNAL MEDICINE

## 2024-01-10 PROCEDURE — 36415 COLL VENOUS BLD VENIPUNCTURE: CPT

## 2024-01-10 PROCEDURE — 80048 BASIC METABOLIC PNL TOTAL CA: CPT

## 2024-01-10 PROCEDURE — 83036 HEMOGLOBIN GLYCOSYLATED A1C: CPT

## 2024-01-10 RX ORDER — FUROSEMIDE 10 MG/ML
20 INJECTION INTRAMUSCULAR; INTRAVENOUS DAILY
Status: DISCONTINUED | OUTPATIENT
Start: 2024-01-11 | End: 2024-01-11

## 2024-01-10 RX ADMIN — FUROSEMIDE 40 MG: 10 INJECTION, SOLUTION INTRAMUSCULAR; INTRAVENOUS at 08:29

## 2024-01-10 RX ADMIN — CLONIDINE HYDROCHLORIDE 0.3 MG: 0.1 TABLET ORAL at 08:29

## 2024-01-10 RX ADMIN — CARVEDILOL 25 MG: 12.5 TABLET, FILM COATED ORAL at 08:29

## 2024-01-10 RX ADMIN — SERTRALINE HYDROCHLORIDE 50 MG: 50 TABLET ORAL at 08:29

## 2024-01-10 RX ADMIN — CARVEDILOL 25 MG: 12.5 TABLET, FILM COATED ORAL at 17:18

## 2024-01-10 RX ADMIN — CLONIDINE HYDROCHLORIDE 0.3 MG: 0.1 TABLET ORAL at 22:03

## 2024-01-10 RX ADMIN — AMLODIPINE BESYLATE 10 MG: 5 TABLET ORAL at 08:29

## 2024-01-10 RX ADMIN — POTASSIUM CHLORIDE 20 MEQ: 1500 TABLET, EXTENDED RELEASE ORAL at 08:29

## 2024-01-10 NOTE — PLAN OF CARE
Problem: Discharge Planning  Goal: Discharge to home or other facility with appropriate resources  Outcome: Progressing  Flowsheets (Taken 1/9/2024 2000)  Discharge to home or other facility with appropriate resources:   Identify barriers to discharge with patient and caregiver   Identify discharge learning needs (meds, wound care, etc)     Problem: Safety - Adult  Goal: Free from fall injury  Outcome: Progressing     Problem: Pain  Goal: Verbalizes/displays adequate comfort level or baseline comfort level  Outcome: Progressing  Flowsheets (Taken 1/9/2024 2000)  Verbalizes/displays adequate comfort level or baseline comfort level:   Encourage patient to monitor pain and request assistance   Assess pain using appropriate pain scale   Administer analgesics based on type and severity of pain and evaluate response   Implement non-pharmacological measures as appropriate and evaluate response

## 2024-01-11 PROBLEM — R09.89 SUSPECTED PULMONARY HYPERTENSION: Status: ACTIVE | Noted: 2024-01-08

## 2024-01-11 PROBLEM — I50.9 ACUTE CHF (CONGESTIVE HEART FAILURE) (HCC): Status: ACTIVE | Noted: 2024-01-08

## 2024-01-11 LAB
25(OH)D3 SERPL-MCNC: 11.1 NG/ML (ref 30–100)
ANION GAP SERPL CALC-SCNC: 8 MMOL/L (ref 3–18)
APTT PPP: 69.6 SEC (ref 23–36.4)
APTT PPP: 84.6 SEC (ref 23–36.4)
BASOPHILS # BLD: 0.1 K/UL (ref 0–0.1)
BASOPHILS NFR BLD: 1 % (ref 0–2)
BUN SERPL-MCNC: 29 MG/DL (ref 7–18)
BUN/CREAT SERPL: 14 (ref 12–20)
CALCIUM SERPL-MCNC: 8.4 MG/DL (ref 8.5–10.1)
CHLORIDE SERPL-SCNC: 109 MMOL/L (ref 100–111)
CO2 SERPL-SCNC: 23 MMOL/L (ref 21–32)
CREAT SERPL-MCNC: 2.04 MG/DL (ref 0.6–1.3)
DIFFERENTIAL METHOD BLD: ABNORMAL
EOSINOPHIL # BLD: 0.2 K/UL (ref 0–0.4)
EOSINOPHIL NFR BLD: 3 % (ref 0–5)
ERYTHROCYTE [DISTWIDTH] IN BLOOD BY AUTOMATED COUNT: 15.7 % (ref 11.6–14.5)
FERRITIN SERPL-MCNC: 77 NG/ML (ref 8–388)
GLUCOSE BLD STRIP.AUTO-MCNC: 101 MG/DL (ref 70–110)
GLUCOSE BLD STRIP.AUTO-MCNC: 138 MG/DL (ref 70–110)
GLUCOSE BLD STRIP.AUTO-MCNC: 208 MG/DL (ref 70–110)
GLUCOSE BLD STRIP.AUTO-MCNC: 257 MG/DL (ref 70–110)
GLUCOSE SERPL-MCNC: 129 MG/DL (ref 74–99)
HCT VFR BLD AUTO: 35.3 % (ref 36–48)
HGB BLD-MCNC: 11.4 G/DL (ref 13–16)
IMM GRANULOCYTES # BLD AUTO: 0 K/UL (ref 0–0.04)
IMM GRANULOCYTES NFR BLD AUTO: 0 % (ref 0–0.5)
INR PPP: 2 (ref 0.9–1.1)
IRON SATN MFR SERPL: 8 % (ref 20–50)
IRON SERPL-MCNC: 29 UG/DL (ref 50–175)
LYMPHOCYTES # BLD: 0.9 K/UL (ref 0.9–3.6)
LYMPHOCYTES NFR BLD: 20 % (ref 21–52)
MCH RBC QN AUTO: 25.6 PG (ref 24–34)
MCHC RBC AUTO-ENTMCNC: 32.3 G/DL (ref 31–37)
MCV RBC AUTO: 79.3 FL (ref 78–100)
MONOCYTES # BLD: 0.5 K/UL (ref 0.05–1.2)
MONOCYTES NFR BLD: 10 % (ref 3–10)
NEUTS SEG # BLD: 3.1 K/UL (ref 1.8–8)
NEUTS SEG NFR BLD: 66 % (ref 40–73)
NRBC # BLD: 0 K/UL (ref 0–0.01)
NRBC BLD-RTO: 0 PER 100 WBC
PHOSPHATE SERPL-MCNC: 3 MG/DL (ref 2.5–4.9)
PLATELET # BLD AUTO: 210 K/UL (ref 135–420)
PMV BLD AUTO: 10.2 FL (ref 9.2–11.8)
POTASSIUM SERPL-SCNC: 3.4 MMOL/L (ref 3.5–5.5)
PROTHROMBIN TIME: 22.5 SEC (ref 11.9–14.7)
RBC # BLD AUTO: 4.45 M/UL (ref 4.35–5.65)
SODIUM SERPL-SCNC: 140 MMOL/L (ref 136–145)
TIBC SERPL-MCNC: 349 UG/DL (ref 250–450)
WBC # BLD AUTO: 4.7 K/UL (ref 4.6–13.2)

## 2024-01-11 PROCEDURE — 6370000000 HC RX 637 (ALT 250 FOR IP): Performed by: HOSPITALIST

## 2024-01-11 PROCEDURE — 6360000002 HC RX W HCPCS: Performed by: NURSE PRACTITIONER

## 2024-01-11 PROCEDURE — 83970 ASSAY OF PARATHORMONE: CPT

## 2024-01-11 PROCEDURE — 84100 ASSAY OF PHOSPHORUS: CPT

## 2024-01-11 PROCEDURE — 82306 VITAMIN D 25 HYDROXY: CPT

## 2024-01-11 PROCEDURE — 6360000002 HC RX W HCPCS: Performed by: HOSPITALIST

## 2024-01-11 PROCEDURE — 1100000003 HC PRIVATE W/ TELEMETRY

## 2024-01-11 PROCEDURE — 80048 BASIC METABOLIC PNL TOTAL CA: CPT

## 2024-01-11 PROCEDURE — 83540 ASSAY OF IRON: CPT

## 2024-01-11 PROCEDURE — 36415 COLL VENOUS BLD VENIPUNCTURE: CPT

## 2024-01-11 PROCEDURE — 82728 ASSAY OF FERRITIN: CPT

## 2024-01-11 PROCEDURE — 85025 COMPLETE CBC W/AUTO DIFF WBC: CPT

## 2024-01-11 PROCEDURE — 82962 GLUCOSE BLOOD TEST: CPT

## 2024-01-11 PROCEDURE — 85610 PROTHROMBIN TIME: CPT

## 2024-01-11 PROCEDURE — 83550 IRON BINDING TEST: CPT

## 2024-01-11 PROCEDURE — 85730 THROMBOPLASTIN TIME PARTIAL: CPT

## 2024-01-11 RX ORDER — HYDRALAZINE HYDROCHLORIDE 50 MG/1
50 TABLET, FILM COATED ORAL EVERY 8 HOURS SCHEDULED
Status: DISCONTINUED | OUTPATIENT
Start: 2024-01-11 | End: 2024-01-17 | Stop reason: HOSPADM

## 2024-01-11 RX ADMIN — POTASSIUM CHLORIDE 20 MEQ: 1500 TABLET, EXTENDED RELEASE ORAL at 08:21

## 2024-01-11 RX ADMIN — SERTRALINE HYDROCHLORIDE 50 MG: 50 TABLET ORAL at 08:21

## 2024-01-11 RX ADMIN — AMLODIPINE BESYLATE 10 MG: 5 TABLET ORAL at 08:21

## 2024-01-11 RX ADMIN — HYDRALAZINE HYDROCHLORIDE 50 MG: 50 TABLET, FILM COATED ORAL at 13:14

## 2024-01-11 RX ADMIN — HYDRALAZINE HYDROCHLORIDE 50 MG: 50 TABLET, FILM COATED ORAL at 21:06

## 2024-01-11 RX ADMIN — CLONIDINE HYDROCHLORIDE 0.3 MG: 0.1 TABLET ORAL at 21:06

## 2024-01-11 RX ADMIN — HEPARIN SODIUM 2000 UNITS: 1000 INJECTION INTRAVENOUS; SUBCUTANEOUS at 08:19

## 2024-01-11 RX ADMIN — POTASSIUM BICARBONATE 20 MEQ: 782 TABLET, EFFERVESCENT ORAL at 13:14

## 2024-01-11 RX ADMIN — HEPARIN SODIUM 16 UNITS/KG/HR: 10000 INJECTION, SOLUTION INTRAVENOUS at 03:27

## 2024-01-11 RX ADMIN — CLONIDINE HYDROCHLORIDE 0.3 MG: 0.1 TABLET ORAL at 08:20

## 2024-01-11 RX ADMIN — CARVEDILOL 25 MG: 12.5 TABLET, FILM COATED ORAL at 17:31

## 2024-01-11 RX ADMIN — CARVEDILOL 25 MG: 12.5 TABLET, FILM COATED ORAL at 08:21

## 2024-01-11 RX ADMIN — FUROSEMIDE 20 MG: 10 INJECTION, SOLUTION INTRAMUSCULAR; INTRAVENOUS at 08:21

## 2024-01-11 ASSESSMENT — PAIN SCALES - GENERAL: PAINLEVEL_OUTOF10: 0

## 2024-01-11 NOTE — CONSULTS
RENAL CONSULT  2024    Patient:  Benjamin Alcocer  :  1970  Gender:  male  MRN #:  [id number]    Reason for Consult: management of CKD and CHF     History of Present Illness:    Benjamin Alcocer is a 53 y.o. year old male  with ongoing Hypertension, diabetes , CHF , CKD stage 3GB presented with progressive shortness of breath   Admitted for CHF , treated with lasix   ECHO showed very low EF  When I saw him this morning he was lying flat, denied shortness of breath and chest pain   Urinating fine , denied other symptoms           Past Medical History:   Diagnosis Date    Congestive heart failure, unspecified     chronic    Diabetes (HCC)     Dyspnea on exertion     Heart failure (HCC)     Hyperglycemia     Hypertension     moderately severe    Renal insufficiency      History reviewed. No pertinent surgical history.  Family History   Problem Relation Age of Onset    Heart Disease Father     Diabetes Mother     Hypertension Other         family history - nos     Allergies   Allergen Reactions    Penicillin G      Other reaction(s): Unknown (comments)     Current Facility-Administered Medications   Medication Dose Route Frequency Provider Last Rate Last Admin    hydrALAZINE (APRESOLINE) tablet 50 mg  50 mg Oral 3 times per day Marina Sterling MD        potassium bicarb-citric acid (EFFER-K) effervescent tablet 20 mEq  20 mEq Oral Once Marina Sterling MD        glucose chewable tablet 16 g  4 tablet Oral PRN Marina Sterling MD        dextrose bolus 10% 125 mL  125 mL IntraVENous PRN Marina Sterling MD        Or    dextrose bolus 10% 250 mL  250 mL IntraVENous PRN Marina Sterling MD        glucagon (rDNA) injection 1 mg  1 mg SubCUTAneous PRN Marina Sterling MD        dextrose 10 % infusion   IntraVENous Continuous PRN Marina Sterling MD        heparin (porcine) injection 4,000 Units  4,000 Units IntraVENous PRN Oj Calvo ACNP   4,000 Units at 24 0640    heparin (porcine) injection 2,000 Units  2,000 Units

## 2024-01-11 NOTE — PLAN OF CARE
Problem: Discharge Planning  Goal: Discharge to home or other facility with appropriate resources  Outcome: Progressing  Flowsheets (Taken 1/10/2024 2000)  Discharge to home or other facility with appropriate resources:   Identify barriers to discharge with patient and caregiver   Identify discharge learning needs (meds, wound care, etc)     Problem: Safety - Adult  Goal: Free from fall injury  Outcome: Progressing     Problem: Pain  Goal: Verbalizes/displays adequate comfort level or baseline comfort level  Outcome: Progressing  Flowsheets (Taken 1/10/2024 2000)  Verbalizes/displays adequate comfort level or baseline comfort level:   Encourage patient to monitor pain and request assistance   Assess pain using appropriate pain scale   Administer analgesics based on type and severity of pain and evaluate response   Implement non-pharmacological measures as appropriate and evaluate response      No significant past surgical history

## 2024-01-12 LAB
ANION GAP SERPL CALC-SCNC: 7 MMOL/L (ref 3–18)
APTT PPP: 84.5 SEC (ref 23–36.4)
APTT PPP: 84.8 SEC (ref 23–36.4)
BUN SERPL-MCNC: 32 MG/DL (ref 7–18)
BUN/CREAT SERPL: 15 (ref 12–20)
CALCIUM SERPL-MCNC: 8.2 MG/DL (ref 8.5–10.1)
CALCIUM SERPL-MCNC: 8.5 MG/DL (ref 8.5–10.1)
CHLORIDE SERPL-SCNC: 108 MMOL/L (ref 100–111)
CO2 SERPL-SCNC: 24 MMOL/L (ref 21–32)
CREAT SERPL-MCNC: 2.14 MG/DL (ref 0.6–1.3)
ERYTHROCYTE [DISTWIDTH] IN BLOOD BY AUTOMATED COUNT: 15.7 % (ref 11.6–14.5)
GLUCOSE BLD STRIP.AUTO-MCNC: 143 MG/DL (ref 70–110)
GLUCOSE BLD STRIP.AUTO-MCNC: 149 MG/DL (ref 70–110)
GLUCOSE BLD STRIP.AUTO-MCNC: 150 MG/DL (ref 70–110)
GLUCOSE BLD STRIP.AUTO-MCNC: 161 MG/DL (ref 70–110)
GLUCOSE SERPL-MCNC: 135 MG/DL (ref 74–99)
HCT VFR BLD AUTO: 33.2 % (ref 36–48)
HGB BLD-MCNC: 10.8 G/DL (ref 13–16)
INR PPP: 1.7 (ref 0.9–1.1)
MCH RBC QN AUTO: 25.8 PG (ref 24–34)
MCHC RBC AUTO-ENTMCNC: 32.5 G/DL (ref 31–37)
MCV RBC AUTO: 79.4 FL (ref 78–100)
NRBC # BLD: 0 K/UL (ref 0–0.01)
NRBC BLD-RTO: 0 PER 100 WBC
PLATELET # BLD AUTO: 202 K/UL (ref 135–420)
PMV BLD AUTO: 10.3 FL (ref 9.2–11.8)
POTASSIUM SERPL-SCNC: 3.5 MMOL/L (ref 3.5–5.5)
PROTHROMBIN TIME: 20.3 SEC (ref 11.9–14.7)
PTH-INTACT SERPL-MCNC: 204.6 PG/ML (ref 18.4–88)
RBC # BLD AUTO: 4.18 M/UL (ref 4.35–5.65)
SODIUM SERPL-SCNC: 139 MMOL/L (ref 136–145)
WBC # BLD AUTO: 5.1 K/UL (ref 4.6–13.2)

## 2024-01-12 PROCEDURE — 6360000002 HC RX W HCPCS: Performed by: NURSE PRACTITIONER

## 2024-01-12 PROCEDURE — 1100000003 HC PRIVATE W/ TELEMETRY

## 2024-01-12 PROCEDURE — 82962 GLUCOSE BLOOD TEST: CPT

## 2024-01-12 PROCEDURE — 85027 COMPLETE CBC AUTOMATED: CPT

## 2024-01-12 PROCEDURE — 85610 PROTHROMBIN TIME: CPT

## 2024-01-12 PROCEDURE — 80048 BASIC METABOLIC PNL TOTAL CA: CPT

## 2024-01-12 PROCEDURE — 85730 THROMBOPLASTIN TIME PARTIAL: CPT

## 2024-01-12 PROCEDURE — 6370000000 HC RX 637 (ALT 250 FOR IP): Performed by: HOSPITALIST

## 2024-01-12 PROCEDURE — 36415 COLL VENOUS BLD VENIPUNCTURE: CPT

## 2024-01-12 PROCEDURE — 2700000000 HC OXYGEN THERAPY PER DAY

## 2024-01-12 RX ADMIN — HYDRALAZINE HYDROCHLORIDE 50 MG: 50 TABLET, FILM COATED ORAL at 21:01

## 2024-01-12 RX ADMIN — SERTRALINE HYDROCHLORIDE 50 MG: 50 TABLET ORAL at 09:46

## 2024-01-12 RX ADMIN — HYDRALAZINE HYDROCHLORIDE 50 MG: 50 TABLET, FILM COATED ORAL at 17:50

## 2024-01-12 RX ADMIN — POTASSIUM CHLORIDE 20 MEQ: 1500 TABLET, EXTENDED RELEASE ORAL at 09:46

## 2024-01-12 RX ADMIN — CARVEDILOL 25 MG: 12.5 TABLET, FILM COATED ORAL at 09:46

## 2024-01-12 RX ADMIN — AMLODIPINE BESYLATE 10 MG: 5 TABLET ORAL at 09:46

## 2024-01-12 RX ADMIN — CLONIDINE HYDROCHLORIDE 0.3 MG: 0.1 TABLET ORAL at 21:01

## 2024-01-12 RX ADMIN — HYDRALAZINE HYDROCHLORIDE 50 MG: 50 TABLET, FILM COATED ORAL at 05:46

## 2024-01-12 RX ADMIN — CLONIDINE HYDROCHLORIDE 0.3 MG: 0.1 TABLET ORAL at 09:46

## 2024-01-12 RX ADMIN — CARVEDILOL 25 MG: 12.5 TABLET, FILM COATED ORAL at 17:50

## 2024-01-12 RX ADMIN — HEPARIN SODIUM 17 UNITS/KG/HR: 10000 INJECTION, SOLUTION INTRAVENOUS at 12:54

## 2024-01-12 ASSESSMENT — PAIN SCALES - GENERAL: PAINLEVEL_OUTOF10: 0

## 2024-01-12 NOTE — ACP (ADVANCE CARE PLANNING)
Advance Care Planning     Advance Care Planning Inpatient Note  [facility] Department    Today's Date: 1/12/2024  Unit: [address] CARDIAC/MEDICAL    Received request from rounding.  Upon review of chart and communication with care team, patient's decision making abilities are not in question.. Patient and Significant other  was/were present in the room during visit.    Goals of ACP Conversation:  Discuss advance care planning documents    Health Care Decision Makers:     No healthcare decision makers have been documented.  Click here to complete HealthCare Decision Makers including selection of the Healthcare Decision Maker Relationship (ie \"Primary\")  Summary:  No Decision Maker named by patient at this time              Assessment:  , Patient and his significant other were in room and discussing ACP documents and State Decision Maker hierarchy.  Patient was pleasant yet slightly out of breath.  He engaged in conversation about documents, however did not indicate he wanted to complete them at this time.    Interventions:  Provided education on documents for clarity and greater understanding  Discussed and provided education on state decision maker hierarchy    Care Preferences Communicated:   No    Outcomes/Plan:  ACP Discussion: Postponed    Electronically signed by Chaplain Isrrael on 1/12/2024 at 2:50 PM

## 2024-01-13 LAB
ANION GAP SERPL CALC-SCNC: 8 MMOL/L (ref 3–18)
APTT PPP: 114.9 SEC (ref 23–36.4)
APTT PPP: 58.2 SEC (ref 23–36.4)
APTT PPP: 71.6 SEC (ref 23–36.4)
BUN SERPL-MCNC: 29 MG/DL (ref 7–18)
BUN/CREAT SERPL: 14 (ref 12–20)
CALCIUM SERPL-MCNC: 8.6 MG/DL (ref 8.5–10.1)
CHLORIDE SERPL-SCNC: 109 MMOL/L (ref 100–111)
CO2 SERPL-SCNC: 24 MMOL/L (ref 21–32)
CREAT SERPL-MCNC: 2.09 MG/DL (ref 0.6–1.3)
GLUCOSE BLD STRIP.AUTO-MCNC: 127 MG/DL (ref 70–110)
GLUCOSE BLD STRIP.AUTO-MCNC: 138 MG/DL (ref 70–110)
GLUCOSE BLD STRIP.AUTO-MCNC: 144 MG/DL (ref 70–110)
GLUCOSE BLD STRIP.AUTO-MCNC: 169 MG/DL (ref 70–110)
GLUCOSE SERPL-MCNC: 131 MG/DL (ref 74–99)
INR PPP: 1.4 (ref 0.9–1.1)
POTASSIUM SERPL-SCNC: 3.7 MMOL/L (ref 3.5–5.5)
PROTHROMBIN TIME: 17.7 SEC (ref 11.9–14.7)
SODIUM SERPL-SCNC: 141 MMOL/L (ref 136–145)

## 2024-01-13 PROCEDURE — 85610 PROTHROMBIN TIME: CPT

## 2024-01-13 PROCEDURE — 36415 COLL VENOUS BLD VENIPUNCTURE: CPT

## 2024-01-13 PROCEDURE — 1100000003 HC PRIVATE W/ TELEMETRY

## 2024-01-13 PROCEDURE — 82962 GLUCOSE BLOOD TEST: CPT

## 2024-01-13 PROCEDURE — 2700000000 HC OXYGEN THERAPY PER DAY

## 2024-01-13 PROCEDURE — 85730 THROMBOPLASTIN TIME PARTIAL: CPT

## 2024-01-13 PROCEDURE — 6370000000 HC RX 637 (ALT 250 FOR IP): Performed by: FAMILY MEDICINE

## 2024-01-13 PROCEDURE — 6370000000 HC RX 637 (ALT 250 FOR IP): Performed by: HOSPITALIST

## 2024-01-13 PROCEDURE — 6360000002 HC RX W HCPCS: Performed by: NURSE PRACTITIONER

## 2024-01-13 PROCEDURE — 80048 BASIC METABOLIC PNL TOTAL CA: CPT

## 2024-01-13 RX ORDER — SENNOSIDES A AND B 8.6 MG/1
1 TABLET, FILM COATED ORAL PRN
Status: DISCONTINUED | OUTPATIENT
Start: 2024-01-13 | End: 2024-01-14

## 2024-01-13 RX ADMIN — HEPARIN SODIUM 2000 UNITS: 1000 INJECTION INTRAVENOUS; SUBCUTANEOUS at 06:22

## 2024-01-13 RX ADMIN — CARVEDILOL 25 MG: 12.5 TABLET, FILM COATED ORAL at 17:32

## 2024-01-13 RX ADMIN — HYDRALAZINE HYDROCHLORIDE 50 MG: 50 TABLET, FILM COATED ORAL at 05:50

## 2024-01-13 RX ADMIN — HEPARIN SODIUM 17 UNITS/KG/HR: 10000 INJECTION, SOLUTION INTRAVENOUS at 02:47

## 2024-01-13 RX ADMIN — CARVEDILOL 25 MG: 12.5 TABLET, FILM COATED ORAL at 08:52

## 2024-01-13 RX ADMIN — POTASSIUM CHLORIDE 20 MEQ: 1500 TABLET, EXTENDED RELEASE ORAL at 08:52

## 2024-01-13 RX ADMIN — HYDRALAZINE HYDROCHLORIDE 50 MG: 50 TABLET, FILM COATED ORAL at 15:07

## 2024-01-13 RX ADMIN — HYDRALAZINE HYDROCHLORIDE 50 MG: 50 TABLET, FILM COATED ORAL at 21:16

## 2024-01-13 RX ADMIN — HEPARIN SODIUM 18 UNITS/KG/HR: 10000 INJECTION, SOLUTION INTRAVENOUS at 18:11

## 2024-01-13 RX ADMIN — SENNOSIDES 8.6 MG: 8.6 TABLET, FILM COATED ORAL at 17:32

## 2024-01-13 RX ADMIN — CLONIDINE HYDROCHLORIDE 0.3 MG: 0.1 TABLET ORAL at 21:16

## 2024-01-13 RX ADMIN — AMLODIPINE BESYLATE 10 MG: 5 TABLET ORAL at 08:52

## 2024-01-13 RX ADMIN — SERTRALINE HYDROCHLORIDE 50 MG: 50 TABLET ORAL at 08:52

## 2024-01-13 RX ADMIN — CLONIDINE HYDROCHLORIDE 0.3 MG: 0.1 TABLET ORAL at 08:52

## 2024-01-13 RX ADMIN — HEPARIN SODIUM 2000 UNITS: 1000 INJECTION INTRAVENOUS; SUBCUTANEOUS at 19:21

## 2024-01-13 RX ADMIN — HEPARIN SODIUM 2000 UNITS: 1000 INJECTION INTRAVENOUS; SUBCUTANEOUS at 07:23

## 2024-01-13 ASSESSMENT — PAIN SCALES - GENERAL: PAINLEVEL_OUTOF10: 0

## 2024-01-14 PROBLEM — K59.00 CONSTIPATION: Status: ACTIVE | Noted: 2024-01-14

## 2024-01-14 LAB
ANION GAP SERPL CALC-SCNC: 8 MMOL/L (ref 3–18)
APTT PPP: 78.4 SEC (ref 23–36.4)
APTT PPP: 90.5 SEC (ref 23–36.4)
BUN SERPL-MCNC: 33 MG/DL (ref 7–18)
BUN/CREAT SERPL: 16 (ref 12–20)
CALCIUM SERPL-MCNC: 8.4 MG/DL (ref 8.5–10.1)
CHLORIDE SERPL-SCNC: 108 MMOL/L (ref 100–111)
CO2 SERPL-SCNC: 24 MMOL/L (ref 21–32)
CREAT SERPL-MCNC: 2.09 MG/DL (ref 0.6–1.3)
ERYTHROCYTE [DISTWIDTH] IN BLOOD BY AUTOMATED COUNT: 16.2 % (ref 11.6–14.5)
GLUCOSE BLD STRIP.AUTO-MCNC: 138 MG/DL (ref 70–110)
GLUCOSE BLD STRIP.AUTO-MCNC: 159 MG/DL (ref 70–110)
GLUCOSE BLD STRIP.AUTO-MCNC: 201 MG/DL (ref 70–110)
GLUCOSE BLD STRIP.AUTO-MCNC: 76 MG/DL (ref 70–110)
GLUCOSE SERPL-MCNC: 110 MG/DL (ref 74–99)
HCT VFR BLD AUTO: 33.6 % (ref 36–48)
HGB BLD-MCNC: 10.5 G/DL (ref 13–16)
INR PPP: 1.2 (ref 0.9–1.1)
MCH RBC QN AUTO: 25.4 PG (ref 24–34)
MCHC RBC AUTO-ENTMCNC: 31.3 G/DL (ref 31–37)
MCV RBC AUTO: 81.4 FL (ref 78–100)
NRBC # BLD: 0 K/UL (ref 0–0.01)
NRBC BLD-RTO: 0 PER 100 WBC
PLATELET # BLD AUTO: 207 K/UL (ref 135–420)
PMV BLD AUTO: 10.7 FL (ref 9.2–11.8)
POTASSIUM SERPL-SCNC: 3.7 MMOL/L (ref 3.5–5.5)
PROTHROMBIN TIME: 15.8 SEC (ref 11.9–14.7)
RBC # BLD AUTO: 4.13 M/UL (ref 4.35–5.65)
SODIUM SERPL-SCNC: 140 MMOL/L (ref 136–145)
WBC # BLD AUTO: 4.8 K/UL (ref 4.6–13.2)

## 2024-01-14 PROCEDURE — 85610 PROTHROMBIN TIME: CPT

## 2024-01-14 PROCEDURE — 36415 COLL VENOUS BLD VENIPUNCTURE: CPT

## 2024-01-14 PROCEDURE — 82962 GLUCOSE BLOOD TEST: CPT

## 2024-01-14 PROCEDURE — 1100000003 HC PRIVATE W/ TELEMETRY

## 2024-01-14 PROCEDURE — 6370000000 HC RX 637 (ALT 250 FOR IP): Performed by: HOSPITALIST

## 2024-01-14 PROCEDURE — 80048 BASIC METABOLIC PNL TOTAL CA: CPT

## 2024-01-14 PROCEDURE — 6360000002 HC RX W HCPCS: Performed by: NURSE PRACTITIONER

## 2024-01-14 PROCEDURE — 85730 THROMBOPLASTIN TIME PARTIAL: CPT

## 2024-01-14 PROCEDURE — 85027 COMPLETE CBC AUTOMATED: CPT

## 2024-01-14 PROCEDURE — 6370000000 HC RX 637 (ALT 250 FOR IP): Performed by: FAMILY MEDICINE

## 2024-01-14 RX ORDER — HYDRALAZINE HYDROCHLORIDE 20 MG/ML
10 INJECTION INTRAMUSCULAR; INTRAVENOUS EVERY 6 HOURS PRN
Status: DISCONTINUED | OUTPATIENT
Start: 2024-01-14 | End: 2024-01-17 | Stop reason: HOSPADM

## 2024-01-14 RX ORDER — ENEMA 19; 7 G/133ML; G/133ML
1 ENEMA RECTAL
Status: COMPLETED | OUTPATIENT
Start: 2024-01-14 | End: 2024-01-14

## 2024-01-14 RX ORDER — SENNA AND DOCUSATE SODIUM 50; 8.6 MG/1; MG/1
1 TABLET, FILM COATED ORAL NIGHTLY
Status: DISCONTINUED | OUTPATIENT
Start: 2024-01-14 | End: 2024-01-17 | Stop reason: HOSPADM

## 2024-01-14 RX ADMIN — CARVEDILOL 25 MG: 12.5 TABLET, FILM COATED ORAL at 17:43

## 2024-01-14 RX ADMIN — HYDRALAZINE HYDROCHLORIDE 50 MG: 50 TABLET, FILM COATED ORAL at 05:01

## 2024-01-14 RX ADMIN — POTASSIUM CHLORIDE 20 MEQ: 1500 TABLET, EXTENDED RELEASE ORAL at 08:44

## 2024-01-14 RX ADMIN — SERTRALINE HYDROCHLORIDE 50 MG: 50 TABLET ORAL at 08:44

## 2024-01-14 RX ADMIN — CLONIDINE HYDROCHLORIDE 0.3 MG: 0.1 TABLET ORAL at 08:44

## 2024-01-14 RX ADMIN — HEPARIN SODIUM 19 UNITS/KG/HR: 10000 INJECTION, SOLUTION INTRAVENOUS at 22:04

## 2024-01-14 RX ADMIN — HEPARIN SODIUM 19 UNITS/KG/HR: 10000 INJECTION, SOLUTION INTRAVENOUS at 11:07

## 2024-01-14 RX ADMIN — HYDRALAZINE HYDROCHLORIDE 50 MG: 50 TABLET, FILM COATED ORAL at 12:42

## 2024-01-14 RX ADMIN — SODIUM PHOSPHATE, DIBASIC AND SODIUM PHOSPHATE, MONOBASIC 1 ENEMA: 7; 19 ENEMA RECTAL at 12:42

## 2024-01-14 RX ADMIN — SENNOSIDES AND DOCUSATE SODIUM 1 TABLET: 8.6; 5 TABLET ORAL at 21:18

## 2024-01-14 RX ADMIN — CLONIDINE HYDROCHLORIDE 0.3 MG: 0.1 TABLET ORAL at 20:31

## 2024-01-14 RX ADMIN — CARVEDILOL 25 MG: 12.5 TABLET, FILM COATED ORAL at 08:44

## 2024-01-14 RX ADMIN — AMLODIPINE BESYLATE 10 MG: 5 TABLET ORAL at 08:44

## 2024-01-14 RX ADMIN — HYDRALAZINE HYDROCHLORIDE 50 MG: 50 TABLET, FILM COATED ORAL at 21:17

## 2024-01-14 RX ADMIN — SENNOSIDES 8.6 MG: 8.6 TABLET, FILM COATED ORAL at 11:08

## 2024-01-14 RX ADMIN — INSULIN LISPRO 2 UNITS: 100 INJECTION, SOLUTION INTRAVENOUS; SUBCUTANEOUS at 12:42

## 2024-01-14 ASSESSMENT — PAIN SCALES - GENERAL
PAINLEVEL_OUTOF10: 0

## 2024-01-14 NOTE — PLAN OF CARE
Problem: Discharge Planning  Goal: Discharge to home or other facility with appropriate resources  Outcome: Progressing     Problem: Safety - Adult  Goal: Free from fall injury  Outcome: Progressing     Problem: Pain  Goal: Verbalizes/displays adequate comfort level or baseline comfort level  Outcome: Progressing     Problem: Chronic Conditions and Co-morbidities  Goal: Patient's chronic conditions and co-morbidity symptoms are monitored and maintained or improved  Outcome: Progressing

## 2024-01-15 PROBLEM — I27.20 PULMONARY HYPERTENSION (HCC): Status: ACTIVE | Noted: 2024-01-08

## 2024-01-15 PROBLEM — I34.0 MITRAL REGURGITATION: Status: ACTIVE | Noted: 2024-01-08

## 2024-01-15 PROBLEM — I50.41 ACUTE COMBINED SYSTOLIC AND DIASTOLIC CHF, NYHA CLASS 1 (HCC): Status: ACTIVE | Noted: 2024-01-08

## 2024-01-15 LAB
ANION GAP SERPL CALC-SCNC: 7 MMOL/L (ref 3–18)
APTT PPP: 73.9 SEC (ref 23–36.4)
BASOPHILS # BLD: 0 K/UL (ref 0–0.1)
BASOPHILS NFR BLD: 1 % (ref 0–2)
BUN SERPL-MCNC: 30 MG/DL (ref 7–18)
BUN/CREAT SERPL: 14 (ref 12–20)
CALCIUM SERPL-MCNC: 8.8 MG/DL (ref 8.5–10.1)
CHLORIDE SERPL-SCNC: 108 MMOL/L (ref 100–111)
CO2 SERPL-SCNC: 24 MMOL/L (ref 21–32)
CREAT SERPL-MCNC: 2.11 MG/DL (ref 0.6–1.3)
DIFFERENTIAL METHOD BLD: ABNORMAL
EOSINOPHIL # BLD: 0.2 K/UL (ref 0–0.4)
EOSINOPHIL NFR BLD: 4 % (ref 0–5)
ERYTHROCYTE [DISTWIDTH] IN BLOOD BY AUTOMATED COUNT: 16.3 % (ref 11.6–14.5)
GLUCOSE BLD STRIP.AUTO-MCNC: 122 MG/DL (ref 70–110)
GLUCOSE BLD STRIP.AUTO-MCNC: 148 MG/DL (ref 70–110)
GLUCOSE BLD STRIP.AUTO-MCNC: 162 MG/DL (ref 70–110)
GLUCOSE SERPL-MCNC: 121 MG/DL (ref 74–99)
HCT VFR BLD AUTO: 36 % (ref 36–48)
HGB BLD-MCNC: 11.5 G/DL (ref 13–16)
IMM GRANULOCYTES # BLD AUTO: 0 K/UL (ref 0–0.04)
IMM GRANULOCYTES NFR BLD AUTO: 0 % (ref 0–0.5)
INR PPP: 1.1 (ref 0.9–1.1)
LYMPHOCYTES # BLD: 1 K/UL (ref 0.9–3.6)
LYMPHOCYTES NFR BLD: 20 % (ref 21–52)
MCH RBC QN AUTO: 25.8 PG (ref 24–34)
MCHC RBC AUTO-ENTMCNC: 31.9 G/DL (ref 31–37)
MCV RBC AUTO: 80.7 FL (ref 78–100)
MONOCYTES # BLD: 0.4 K/UL (ref 0.05–1.2)
MONOCYTES NFR BLD: 9 % (ref 3–10)
NEUTS SEG # BLD: 3.3 K/UL (ref 1.8–8)
NEUTS SEG NFR BLD: 66 % (ref 40–73)
NRBC # BLD: 0 K/UL (ref 0–0.01)
NRBC BLD-RTO: 0 PER 100 WBC
PLATELET # BLD AUTO: 244 K/UL (ref 135–420)
PMV BLD AUTO: 10.7 FL (ref 9.2–11.8)
POTASSIUM SERPL-SCNC: 4.2 MMOL/L (ref 3.5–5.5)
PROTHROMBIN TIME: 14.8 SEC (ref 11.9–14.7)
RBC # BLD AUTO: 4.46 M/UL (ref 4.35–5.65)
SODIUM SERPL-SCNC: 139 MMOL/L (ref 136–145)
WBC # BLD AUTO: 4.9 K/UL (ref 4.6–13.2)

## 2024-01-15 PROCEDURE — 6360000004 HC RX CONTRAST MEDICATION: Performed by: INTERNAL MEDICINE

## 2024-01-15 PROCEDURE — 6370000000 HC RX 637 (ALT 250 FOR IP): Performed by: HOSPITALIST

## 2024-01-15 PROCEDURE — 2709999900 HC NON-CHARGEABLE SUPPLY: Performed by: INTERNAL MEDICINE

## 2024-01-15 PROCEDURE — 85730 THROMBOPLASTIN TIME PARTIAL: CPT

## 2024-01-15 PROCEDURE — 2500000003 HC RX 250 WO HCPCS: Performed by: INTERNAL MEDICINE

## 2024-01-15 PROCEDURE — 82962 GLUCOSE BLOOD TEST: CPT

## 2024-01-15 PROCEDURE — B2111ZZ FLUOROSCOPY OF MULTIPLE CORONARY ARTERIES USING LOW OSMOLAR CONTRAST: ICD-10-PCS | Performed by: INTERNAL MEDICINE

## 2024-01-15 PROCEDURE — 93460 R&L HRT ART/VENTRICLE ANGIO: CPT | Performed by: INTERNAL MEDICINE

## 2024-01-15 PROCEDURE — C1769 GUIDE WIRE: HCPCS | Performed by: INTERNAL MEDICINE

## 2024-01-15 PROCEDURE — B2151ZZ FLUOROSCOPY OF LEFT HEART USING LOW OSMOLAR CONTRAST: ICD-10-PCS | Performed by: INTERNAL MEDICINE

## 2024-01-15 PROCEDURE — 1100000003 HC PRIVATE W/ TELEMETRY

## 2024-01-15 PROCEDURE — 6360000002 HC RX W HCPCS: Performed by: NURSE PRACTITIONER

## 2024-01-15 PROCEDURE — 36415 COLL VENOUS BLD VENIPUNCTURE: CPT

## 2024-01-15 PROCEDURE — 6370000000 HC RX 637 (ALT 250 FOR IP): Performed by: INTERNAL MEDICINE

## 2024-01-15 PROCEDURE — 6370000000 HC RX 637 (ALT 250 FOR IP): Performed by: FAMILY MEDICINE

## 2024-01-15 PROCEDURE — 99153 MOD SED SAME PHYS/QHP EA: CPT | Performed by: INTERNAL MEDICINE

## 2024-01-15 PROCEDURE — 6360000002 HC RX W HCPCS: Performed by: INTERNAL MEDICINE

## 2024-01-15 PROCEDURE — 85610 PROTHROMBIN TIME: CPT

## 2024-01-15 PROCEDURE — 99152 MOD SED SAME PHYS/QHP 5/>YRS: CPT | Performed by: INTERNAL MEDICINE

## 2024-01-15 PROCEDURE — 80048 BASIC METABOLIC PNL TOTAL CA: CPT

## 2024-01-15 PROCEDURE — C1894 INTRO/SHEATH, NON-LASER: HCPCS | Performed by: INTERNAL MEDICINE

## 2024-01-15 PROCEDURE — 4A023N7 MEASUREMENT OF CARDIAC SAMPLING AND PRESSURE, LEFT HEART, PERCUTANEOUS APPROACH: ICD-10-PCS | Performed by: INTERNAL MEDICINE

## 2024-01-15 PROCEDURE — 85025 COMPLETE CBC W/AUTO DIFF WBC: CPT

## 2024-01-15 RX ORDER — SODIUM CHLORIDE 9 MG/ML
INJECTION, SOLUTION INTRAVENOUS PRN
Status: CANCELLED | OUTPATIENT
Start: 2024-01-15

## 2024-01-15 RX ORDER — SODIUM CHLORIDE 0.9 % (FLUSH) 0.9 %
5-40 SYRINGE (ML) INJECTION EVERY 12 HOURS SCHEDULED
Status: CANCELLED | OUTPATIENT
Start: 2024-01-15

## 2024-01-15 RX ORDER — HEPARIN SODIUM 200 [USP'U]/100ML
INJECTION, SOLUTION INTRAVENOUS CONTINUOUS PRN
Status: COMPLETED | OUTPATIENT
Start: 2024-01-15 | End: 2024-01-15

## 2024-01-15 RX ORDER — VERAPAMIL HYDROCHLORIDE 2.5 MG/ML
INJECTION, SOLUTION INTRAVENOUS PRN
Status: DISCONTINUED | OUTPATIENT
Start: 2024-01-15 | End: 2024-01-15 | Stop reason: HOSPADM

## 2024-01-15 RX ORDER — ASPIRIN 81 MG/1
TABLET, CHEWABLE ORAL PRN
Status: DISCONTINUED | OUTPATIENT
Start: 2024-01-15 | End: 2024-01-15 | Stop reason: HOSPADM

## 2024-01-15 RX ORDER — LIDOCAINE HYDROCHLORIDE 10 MG/ML
INJECTION, SOLUTION INFILTRATION; PERINEURAL PRN
Status: DISCONTINUED | OUTPATIENT
Start: 2024-01-15 | End: 2024-01-15 | Stop reason: HOSPADM

## 2024-01-15 RX ORDER — HEPARIN SODIUM 1000 [USP'U]/ML
INJECTION, SOLUTION INTRAVENOUS; SUBCUTANEOUS PRN
Status: DISCONTINUED | OUTPATIENT
Start: 2024-01-15 | End: 2024-01-15 | Stop reason: HOSPADM

## 2024-01-15 RX ORDER — ACETAMINOPHEN 325 MG/1
650 TABLET ORAL EVERY 4 HOURS PRN
Status: CANCELLED | OUTPATIENT
Start: 2024-01-15

## 2024-01-15 RX ORDER — MIDAZOLAM HYDROCHLORIDE 1 MG/ML
INJECTION INTRAMUSCULAR; INTRAVENOUS PRN
Status: DISCONTINUED | OUTPATIENT
Start: 2024-01-15 | End: 2024-01-15 | Stop reason: HOSPADM

## 2024-01-15 RX ORDER — SODIUM CHLORIDE 0.9 % (FLUSH) 0.9 %
5-40 SYRINGE (ML) INJECTION PRN
Status: CANCELLED | OUTPATIENT
Start: 2024-01-15

## 2024-01-15 RX ADMIN — CARVEDILOL 25 MG: 12.5 TABLET, FILM COATED ORAL at 08:47

## 2024-01-15 RX ADMIN — CLONIDINE HYDROCHLORIDE 0.3 MG: 0.1 TABLET ORAL at 21:16

## 2024-01-15 RX ADMIN — SENNOSIDES AND DOCUSATE SODIUM 1 TABLET: 8.6; 5 TABLET ORAL at 21:16

## 2024-01-15 RX ADMIN — CLONIDINE HYDROCHLORIDE 0.3 MG: 0.1 TABLET ORAL at 08:47

## 2024-01-15 RX ADMIN — HYDRALAZINE HYDROCHLORIDE 50 MG: 50 TABLET, FILM COATED ORAL at 21:16

## 2024-01-15 RX ADMIN — HYDRALAZINE HYDROCHLORIDE 10 MG: 20 INJECTION INTRAMUSCULAR; INTRAVENOUS at 19:22

## 2024-01-15 RX ADMIN — HYDRALAZINE HYDROCHLORIDE 50 MG: 50 TABLET, FILM COATED ORAL at 06:44

## 2024-01-15 RX ADMIN — CARVEDILOL 25 MG: 12.5 TABLET, FILM COATED ORAL at 21:16

## 2024-01-15 RX ADMIN — SERTRALINE HYDROCHLORIDE 50 MG: 50 TABLET ORAL at 08:48

## 2024-01-15 RX ADMIN — HEPARIN SODIUM 19 UNITS/KG/HR: 10000 INJECTION, SOLUTION INTRAVENOUS at 13:32

## 2024-01-15 RX ADMIN — POTASSIUM CHLORIDE 20 MEQ: 1500 TABLET, EXTENDED RELEASE ORAL at 08:48

## 2024-01-15 RX ADMIN — HYDRALAZINE HYDROCHLORIDE 50 MG: 50 TABLET, FILM COATED ORAL at 13:32

## 2024-01-15 RX ADMIN — AMLODIPINE BESYLATE 10 MG: 5 TABLET ORAL at 08:47

## 2024-01-15 ASSESSMENT — PAIN SCALES - GENERAL
PAINLEVEL_OUTOF10: 0

## 2024-01-15 NOTE — PRE SEDATION
Sedation Plan  ASA: class 1 - normal, healthy patient     Mallampati class: I - soft palate, uvula, fauces, pillars visible.    Sedation plan: local anesthesia and level 2-1: moderate/analgesia (conscious sedation)    Risks, benefits, and alternatives discussed with patient and spouse.        Immediate reassessment prior to sedation:  Patient's status reviewed and vital signs assessed; acceptable to perform procedure and proceed to administer sedation as planned.

## 2024-01-15 NOTE — POST SEDATION
Sedation Post Procedure Note    Patient Name: Benjamin Alcocer   YOB: 1970  Room/Bed: [facility]/  Medical Record Number: [id number]  Date: 1/15/2024   Time: 5:42 PM         Physicians/Assistants: BELINDA BARRETT MD, MD    Procedure Performed: Right heart catheterization, left heart catheterization, coronary angiogram    Post-Sedation Vital Signs:  Vitals:    01/15/24 1739   BP: (!) 165/99   Pulse: 66   Resp: 12   Temp:    SpO2: 92%      Vital signs were reviewed and were stable after the procedure (see flow sheet for vitals)            Post-Sedation Exam: Lungs: clear to auscultation bilaterally without crackles or wheezing and Cardiovascular: regular rate and rhythm, no murmurs rubs or gallops           Complications: none    Electronically signed by BELINDA BARRETT MD on 1/15/2024 at 5:42 PM

## 2024-01-15 NOTE — BRIEF OP NOTE
Brief Postoperative Note      Patient: Benjamin Alcocer  YOB: 1970  MRN: [id number]    Date of Procedure: 1/15/2024    Pre-Op Diagnosis Codes:     * Acute combined systolic and diastolic CHF, NYHA class 1 (HCC) [I50.41]     * Pulmonary hypertension (HCC) [I27.20]     * Cardiomyopathy (HCC) [I42.9]     * Mitral regurgitation [I34.0]    Post-Op Diagnosis: Same       Procedure(s):  Left and right heart cath / coronary angiography    Surgeon(s):  Belinda Kovacs MD    Assistant:  * No surgical staff found *    Anesthesia: Other    Estimated Blood Loss (mL): less than 50     Complications: None    Specimens:   * No specimens in log *    Implants:  * No implants in log *      Drains: * No LDAs found *    Findings:   Mild to moderate CAD  Distal left main have 20% to 30% stenosis  Mid RCA have 50% stenosis.  Mild disease to the distal LAD  Predominantly nonischemic cardiomyopathy  Severe mixed pulmonary hypertension  Findings reviewed with the patient and with other significant  Complete report to follow.  Thanks    Electronically signed by BELINDA KOVACS MD on 1/15/2024 at 5:42 PM

## 2024-01-15 NOTE — PLAN OF CARE
Problem: Discharge Planning  Goal: Discharge to home or other facility with appropriate resources  1/15/2024 1016 by Tessie Arrieta RN  Outcome: Progressing  1/15/2024 0259 by Jessie Worthington RN  Outcome: Progressing     Problem: Safety - Adult  Goal: Free from fall injury  1/15/2024 1016 by Tessie Arrieta RN  Outcome: Progressing  1/15/2024 0259 by Jessie Worthington RN  Outcome: Progressing     Problem: Pain  Goal: Verbalizes/displays adequate comfort level or baseline comfort level  1/15/2024 1016 by Tessie Arrieta RN  Outcome: Progressing  1/15/2024 0259 by Jessie Worthington RN  Outcome: Progressing     Problem: Chronic Conditions and Co-morbidities  Goal: Patient's chronic conditions and co-morbidity symptoms are monitored and maintained or improved  1/15/2024 1016 by Tessie Arrieta RN  Outcome: Progressing  1/15/2024 0259 by Jessie Worthington RN  Outcome: Progressing

## 2024-01-15 NOTE — INTERVAL H&P NOTE
Update History & Physical    The patient's History and Physical of January 15, 2024 was reviewed with the patient and I examined the patient. There was no change. The surgical site was confirmed by the patient and me.     Plan: The risks, benefits, expected outcome, and alternative to the recommended procedure have been discussed with the patient. Patient understands and wants to proceed with the procedure.     Electronically signed by BELINDA BARRETT MD on 1/15/2024 at 3:47 PM

## 2024-01-16 LAB
ANION GAP SERPL CALC-SCNC: 8 MMOL/L (ref 3–18)
BUN SERPL-MCNC: 29 MG/DL (ref 7–18)
BUN/CREAT SERPL: 14 (ref 12–20)
CALCIUM SERPL-MCNC: 8.6 MG/DL (ref 8.5–10.1)
CHLORIDE SERPL-SCNC: 109 MMOL/L (ref 100–111)
CO2 SERPL-SCNC: 23 MMOL/L (ref 21–32)
CREAT SERPL-MCNC: 2.02 MG/DL (ref 0.6–1.3)
ECHO BSA: 2.12 M2
ERYTHROCYTE [DISTWIDTH] IN BLOOD BY AUTOMATED COUNT: 16.5 % (ref 11.6–14.5)
GLUCOSE BLD STRIP.AUTO-MCNC: 115 MG/DL (ref 70–110)
GLUCOSE BLD STRIP.AUTO-MCNC: 140 MG/DL (ref 70–110)
GLUCOSE BLD STRIP.AUTO-MCNC: 163 MG/DL (ref 70–110)
GLUCOSE BLD STRIP.AUTO-MCNC: 166 MG/DL (ref 70–110)
GLUCOSE SERPL-MCNC: 195 MG/DL (ref 74–99)
HCT VFR BLD AUTO: 33 % (ref 36–48)
HGB BLD-MCNC: 10.2 G/DL (ref 13–16)
INR PPP: 1.1 (ref 0.9–1.1)
MCH RBC QN AUTO: 25.2 PG (ref 24–34)
MCHC RBC AUTO-ENTMCNC: 30.9 G/DL (ref 31–37)
MCV RBC AUTO: 81.5 FL (ref 78–100)
NRBC # BLD: 0 K/UL (ref 0–0.01)
NRBC BLD-RTO: 0 PER 100 WBC
PLATELET # BLD AUTO: 216 K/UL (ref 135–420)
PMV BLD AUTO: 10.1 FL (ref 9.2–11.8)
POTASSIUM SERPL-SCNC: 4.3 MMOL/L (ref 3.5–5.5)
PROTHROMBIN TIME: 14.7 SEC (ref 11.9–14.7)
RBC # BLD AUTO: 4.05 M/UL (ref 4.35–5.65)
SODIUM SERPL-SCNC: 140 MMOL/L (ref 136–145)
WBC # BLD AUTO: 4.6 K/UL (ref 4.6–13.2)

## 2024-01-16 PROCEDURE — 2700000000 HC OXYGEN THERAPY PER DAY

## 2024-01-16 PROCEDURE — 85610 PROTHROMBIN TIME: CPT

## 2024-01-16 PROCEDURE — 1100000003 HC PRIVATE W/ TELEMETRY

## 2024-01-16 PROCEDURE — 85027 COMPLETE CBC AUTOMATED: CPT

## 2024-01-16 PROCEDURE — 82962 GLUCOSE BLOOD TEST: CPT

## 2024-01-16 PROCEDURE — 36415 COLL VENOUS BLD VENIPUNCTURE: CPT

## 2024-01-16 PROCEDURE — 80048 BASIC METABOLIC PNL TOTAL CA: CPT

## 2024-01-16 PROCEDURE — 6370000000 HC RX 637 (ALT 250 FOR IP): Performed by: FAMILY MEDICINE

## 2024-01-16 PROCEDURE — 6370000000 HC RX 637 (ALT 250 FOR IP): Performed by: HOSPITALIST

## 2024-01-16 PROCEDURE — 6360000002 HC RX W HCPCS: Performed by: STUDENT IN AN ORGANIZED HEALTH CARE EDUCATION/TRAINING PROGRAM

## 2024-01-16 RX ORDER — WARFARIN SODIUM 7.5 MG/1
7.5 TABLET ORAL
Status: COMPLETED | OUTPATIENT
Start: 2024-01-16 | End: 2024-01-16

## 2024-01-16 RX ORDER — BUMETANIDE 0.25 MG/ML
1 INJECTION INTRAMUSCULAR; INTRAVENOUS 2 TIMES DAILY
Status: DISCONTINUED | OUTPATIENT
Start: 2024-01-16 | End: 2024-01-17

## 2024-01-16 RX ADMIN — SERTRALINE HYDROCHLORIDE 50 MG: 50 TABLET ORAL at 08:45

## 2024-01-16 RX ADMIN — CLONIDINE HYDROCHLORIDE 0.3 MG: 0.1 TABLET ORAL at 08:45

## 2024-01-16 RX ADMIN — POTASSIUM CHLORIDE 20 MEQ: 1500 TABLET, EXTENDED RELEASE ORAL at 08:45

## 2024-01-16 RX ADMIN — WARFARIN SODIUM 7.5 MG: 7.5 TABLET ORAL at 17:07

## 2024-01-16 RX ADMIN — BUMETANIDE 1 MG: 0.25 INJECTION INTRAMUSCULAR; INTRAVENOUS at 21:21

## 2024-01-16 RX ADMIN — HYDRALAZINE HYDROCHLORIDE 50 MG: 50 TABLET, FILM COATED ORAL at 05:56

## 2024-01-16 RX ADMIN — CARVEDILOL 25 MG: 12.5 TABLET, FILM COATED ORAL at 16:39

## 2024-01-16 RX ADMIN — CLONIDINE HYDROCHLORIDE 0.3 MG: 0.1 TABLET ORAL at 21:21

## 2024-01-16 RX ADMIN — SENNOSIDES AND DOCUSATE SODIUM 1 TABLET: 8.6; 5 TABLET ORAL at 21:21

## 2024-01-16 RX ADMIN — CARVEDILOL 25 MG: 12.5 TABLET, FILM COATED ORAL at 08:45

## 2024-01-16 RX ADMIN — HYDRALAZINE HYDROCHLORIDE 50 MG: 50 TABLET, FILM COATED ORAL at 21:21

## 2024-01-16 RX ADMIN — AMLODIPINE BESYLATE 10 MG: 5 TABLET ORAL at 08:45

## 2024-01-16 RX ADMIN — BUMETANIDE 1 MG: 0.25 INJECTION INTRAMUSCULAR; INTRAVENOUS at 13:58

## 2024-01-16 RX ADMIN — HYDRALAZINE HYDROCHLORIDE 50 MG: 50 TABLET, FILM COATED ORAL at 13:58

## 2024-01-16 ASSESSMENT — PAIN SCALES - GENERAL
PAINLEVEL_OUTOF10: 0

## 2024-01-17 VITALS
RESPIRATION RATE: 18 BRPM | HEART RATE: 51 BPM | WEIGHT: 200 LBS | OXYGEN SATURATION: 100 % | SYSTOLIC BLOOD PRESSURE: 150 MMHG | HEIGHT: 70 IN | DIASTOLIC BLOOD PRESSURE: 82 MMHG | TEMPERATURE: 97.5 F | BODY MASS INDEX: 28.63 KG/M2

## 2024-01-17 LAB
ANION GAP SERPL CALC-SCNC: 8 MMOL/L (ref 3–18)
BUN SERPL-MCNC: 28 MG/DL (ref 7–18)
BUN/CREAT SERPL: 13 (ref 12–20)
CALCIUM SERPL-MCNC: 8.7 MG/DL (ref 8.5–10.1)
CHLORIDE SERPL-SCNC: 110 MMOL/L (ref 100–111)
CO2 SERPL-SCNC: 24 MMOL/L (ref 21–32)
CREAT SERPL-MCNC: 2.1 MG/DL (ref 0.6–1.3)
GLUCOSE BLD STRIP.AUTO-MCNC: 167 MG/DL (ref 70–110)
GLUCOSE BLD STRIP.AUTO-MCNC: 192 MG/DL (ref 70–110)
GLUCOSE SERPL-MCNC: 138 MG/DL (ref 74–99)
INR PPP: 1.1 (ref 0.9–1.1)
POTASSIUM SERPL-SCNC: 4.2 MMOL/L (ref 3.5–5.5)
PROTHROMBIN TIME: 14.6 SEC (ref 11.9–14.7)
SODIUM SERPL-SCNC: 142 MMOL/L (ref 136–145)

## 2024-01-17 PROCEDURE — 6370000000 HC RX 637 (ALT 250 FOR IP): Performed by: HOSPITALIST

## 2024-01-17 PROCEDURE — 6360000002 HC RX W HCPCS: Performed by: STUDENT IN AN ORGANIZED HEALTH CARE EDUCATION/TRAINING PROGRAM

## 2024-01-17 PROCEDURE — 6370000000 HC RX 637 (ALT 250 FOR IP): Performed by: STUDENT IN AN ORGANIZED HEALTH CARE EDUCATION/TRAINING PROGRAM

## 2024-01-17 PROCEDURE — 82962 GLUCOSE BLOOD TEST: CPT

## 2024-01-17 PROCEDURE — 80048 BASIC METABOLIC PNL TOTAL CA: CPT

## 2024-01-17 PROCEDURE — 85610 PROTHROMBIN TIME: CPT

## 2024-01-17 PROCEDURE — 36415 COLL VENOUS BLD VENIPUNCTURE: CPT

## 2024-01-17 RX ORDER — BUMETANIDE 0.25 MG/ML
2 INJECTION INTRAMUSCULAR; INTRAVENOUS 2 TIMES DAILY
Status: DISCONTINUED | OUTPATIENT
Start: 2024-01-17 | End: 2024-01-17 | Stop reason: HOSPADM

## 2024-01-17 RX ORDER — CLONIDINE HYDROCHLORIDE 0.1 MG/1
0.1 TABLET ORAL 2 TIMES DAILY
Qty: 60 TABLET | Refills: 3 | Status: SHIPPED | OUTPATIENT
Start: 2024-01-17 | End: 2024-01-17

## 2024-01-17 RX ORDER — HYDRALAZINE HYDROCHLORIDE 50 MG/1
50 TABLET, FILM COATED ORAL EVERY 8 HOURS SCHEDULED
Qty: 90 TABLET | Refills: 3 | Status: SHIPPED | OUTPATIENT
Start: 2024-01-17 | End: 2024-01-17

## 2024-01-17 RX ORDER — CARVEDILOL 25 MG/1
25 TABLET ORAL 2 TIMES DAILY
Qty: 60 TABLET | Refills: 3 | Status: SHIPPED | OUTPATIENT
Start: 2024-01-17

## 2024-01-17 RX ORDER — AMLODIPINE BESYLATE 5 MG/1
5 TABLET ORAL DAILY
Qty: 30 TABLET | Refills: 3 | Status: SHIPPED | OUTPATIENT
Start: 2024-01-18 | End: 2024-01-17

## 2024-01-17 RX ORDER — AMLODIPINE BESYLATE 5 MG/1
5 TABLET ORAL DAILY
Status: DISCONTINUED | OUTPATIENT
Start: 2024-01-18 | End: 2024-01-17 | Stop reason: HOSPADM

## 2024-01-17 RX ORDER — AMLODIPINE BESYLATE 5 MG/1
5 TABLET ORAL DAILY
Qty: 30 TABLET | Refills: 3 | Status: SHIPPED | OUTPATIENT
Start: 2024-01-18

## 2024-01-17 RX ORDER — METOLAZONE 5 MG/1
5 TABLET ORAL ONCE
Status: COMPLETED | OUTPATIENT
Start: 2024-01-17 | End: 2024-01-17

## 2024-01-17 RX ORDER — BUMETANIDE 2 MG/1
2 TABLET ORAL DAILY
Qty: 30 TABLET | Refills: 3 | Status: SHIPPED | OUTPATIENT
Start: 2024-01-17 | End: 2024-01-17

## 2024-01-17 RX ORDER — BUMETANIDE 2 MG/1
2 TABLET ORAL DAILY
Qty: 30 TABLET | Refills: 3 | Status: SHIPPED | OUTPATIENT
Start: 2024-01-17

## 2024-01-17 RX ORDER — CARVEDILOL 25 MG/1
25 TABLET ORAL 2 TIMES DAILY
Qty: 60 TABLET | Refills: 3 | Status: SHIPPED | OUTPATIENT
Start: 2024-01-17 | End: 2024-01-17

## 2024-01-17 RX ORDER — WARFARIN SODIUM 7.5 MG/1
7.5 TABLET ORAL
Status: DISCONTINUED | OUTPATIENT
Start: 2024-01-17 | End: 2024-01-17 | Stop reason: HOSPADM

## 2024-01-17 RX ORDER — HYDRALAZINE HYDROCHLORIDE 50 MG/1
50 TABLET, FILM COATED ORAL EVERY 8 HOURS SCHEDULED
Qty: 90 TABLET | Refills: 3 | Status: SHIPPED | OUTPATIENT
Start: 2024-01-17

## 2024-01-17 RX ORDER — CLONIDINE HYDROCHLORIDE 0.1 MG/1
0.1 TABLET ORAL 2 TIMES DAILY
Status: DISCONTINUED | OUTPATIENT
Start: 2024-01-17 | End: 2024-01-17 | Stop reason: HOSPADM

## 2024-01-17 RX ORDER — CLONIDINE HYDROCHLORIDE 0.1 MG/1
0.1 TABLET ORAL 2 TIMES DAILY
Qty: 60 TABLET | Refills: 3 | Status: SHIPPED | OUTPATIENT
Start: 2024-01-17

## 2024-01-17 RX ADMIN — CLONIDINE HYDROCHLORIDE 0.3 MG: 0.1 TABLET ORAL at 09:21

## 2024-01-17 RX ADMIN — AMLODIPINE BESYLATE 10 MG: 5 TABLET ORAL at 09:21

## 2024-01-17 RX ADMIN — SERTRALINE HYDROCHLORIDE 50 MG: 50 TABLET ORAL at 09:21

## 2024-01-17 RX ADMIN — CARVEDILOL 25 MG: 12.5 TABLET, FILM COATED ORAL at 18:15

## 2024-01-17 RX ADMIN — BUMETANIDE 1 MG: 0.25 INJECTION INTRAMUSCULAR; INTRAVENOUS at 09:21

## 2024-01-17 RX ADMIN — METOLAZONE 5 MG: 5 TABLET ORAL at 12:01

## 2024-01-17 RX ADMIN — CARVEDILOL 25 MG: 12.5 TABLET, FILM COATED ORAL at 09:21

## 2024-01-17 RX ADMIN — POTASSIUM CHLORIDE 20 MEQ: 1500 TABLET, EXTENDED RELEASE ORAL at 09:21

## 2024-01-17 RX ADMIN — HYDRALAZINE HYDROCHLORIDE 50 MG: 50 TABLET, FILM COATED ORAL at 13:09

## 2024-01-17 RX ADMIN — HYDRALAZINE HYDROCHLORIDE 50 MG: 50 TABLET, FILM COATED ORAL at 06:20

## 2024-01-18 NOTE — PROGRESS NOTES
Hospitalist Progress Note    Patient: Benjamin Alcocer MRN: [id number]  CSN: [id number]    YOB: 1970  Age: 53 y.o.  Sex: male    DOA: 1/8/2024 LOS:  LOS: 1 day          Chief Complaint:      SOB    Assessment/Plan     1.  Dyspnea on exertion.  2.  Cardiomyopathy, chronic.  3.  History of left ventricular thrombus.  4.  Hypertension.  5.  Chronic kidney disease.  6.  Diabetes.  7.  Hyperlipidemia.    Tobacco cess counselled  UDS neg    Cr stable  Daily lasix  Continue med plan as doing    Follow on tele    VQ is neg for PE    Heparin gtt as warfarin sub therapeutic    Further recs per cardiology    Echo done, and pending     Disposition :  Active Hospital Problems    Diagnosis     Cardiomyopathy (HCC) [I42.9]     WILSON (dyspnea on exertion) [R06.09]     Tobacco use [Z72.0]     Chronic anticoagulation [Z79.01]     Hypertension [I10]     Elevated troponin [R79.89]        Subjective:  He feels breathing is better today      Review of systems:    Constitutional: denies fevers  Respiratory: denies SOB, cough  Cardiovascular: denies chest pain, palpitations  Gastrointestinal: denies nausea, vomiting, diarrhea      Vital signs/Intake and Output:  Visit Vitals  BP (!) 147/102   Pulse 83   Temp 97.3 °F (36.3 °C) (Oral)   Resp 18   Ht 1.778 m (5' 10\")   Wt 91.2 kg (201 lb)   SpO2 97%   BMI 28.84 kg/m²     Current Shift:  No intake/output data recorded.  Last three shifts:  No intake/output data recorded.    Exam:    General: Well developed, alert, NAD, OX3  CVS:Regular rate and rhythm, no M/R/G, S1/S2 heard, no thrill  Lungs:Clear to auscultation bilaterally, no wheezes, rhonchi, or rales  Abdomen: Soft, Nontender, No distention, Normal Bowel sounds  Extremities: No C/C/E, pulses palpable 2+  Neuro:grossly normal , follows commands  Psych:appropriate    Labs: Results:       Chemistry Recent Labs     01/08/24  1514 01/09/24  0531   GLUCOSE 145* 136*    141   K 3.3* 3.6    110   CO2 24 24   BUN 21* 22* 
    Hospitalist Progress Note    Patient: Benjamin Alcocer MRN: [id number]  CSN: [id number]    YOB: 1970  Age: 53 y.o.  Sex: male    DOA: 1/8/2024 LOS:  LOS: 3 days          Chief Complaint:    SOB      Assessment/Plan       1.  Dyspnea on exertion. Due to acute on chronic systolic CHF  2.  Cardiomyopathy, chronic. BUT EF now 15-20% on echo  3.  History of left ventricular thrombus.  4.  Hypertension.  5.  Chronic kidney disease. Stage 3, with MARCY  6.  Diabetes. BG controlled  7.  Hyperlipidemia.    Stop lasix  Monitor Cr daily  Slight increase to 2 now  Hold diuretics  Repelte potassium PO    Add back hydralazine for HTN uncontrolled    Consult nephrology for renal disease and need for cath     Tobacco cess counselled  UDS neg     VQ is neg for PE     Heparin gtt and hold coumadin for cath now, INR 2.0     Cardiac cath planned when renal function and INR allow     D/w patient  Disposition :  Active Hospital Problems    Diagnosis     Acute on chronic systolic (congestive) heart failure (HCC) [I50.23]     CKD (chronic kidney disease) stage 3, GFR 30-59 ml/min (HCC) [N18.30]     Cardiomyopathy (HCC) [I42.9]     WILSON (dyspnea on exertion) [R06.09]     Tobacco use [Z72.0]     Chronic anticoagulation [Z79.01]     Hypertension [I10]     Elevated troponin [R79.89]        Subjective:    Denies SOB unless lying on back  No cough  No CP    Review of systems:    Constitutional: denies fevers, chills  Respiratory: denies SOB, cough  Cardiovascular: denies palpitations  Gastrointestinal: denies nausea, vomiting, diarrhea      Vital signs/Intake and Output:  Visit Vitals  BP (!) 152/101   Pulse 79   Temp 98.1 °F (36.7 °C) (Oral)   Resp 18   Ht 1.778 m (5' 10\")   Wt 91.2 kg (201 lb)   SpO2 100%   BMI 28.84 kg/m²     Current Shift:  No intake/output data recorded.  Last three shifts:  01/09 1901 - 01/11 0700  In: 750 [P.O.:750]  Out: -     Exam:    General: Well developed, alert, NAD, OX3  CVS:Regular rate and rhythm, no 
    Hospitalist Progress Note    Patient: Benjamin Alcocer MRN: [id number]  CSN: [id number]    YOB: 1970  Age: 53 y.o.  Sex: male    DOA: 1/8/2024 LOS:  LOS: 9 days          Chief Complaint:      WILSON    Assessment/Plan     Dyspnea on exertion. Due to acute on chronic systolic CHF. Improved.   VQ is neg for PE.     Cardiomyopathy, chronic.  EF  15-20% on echo. Seen by cardiology  Cath done  Has pulm HTN and coronary disease     Acute on chronic CHF with Severely reduced ejection fraction 15% to 20% -   On Lasix  Pulmonary hypertension  Mild to moderate MR  bumex     Constipation-resolved     History of left ventricular thrombus      Hypertension -   stable     Chronic kidney disease. Stage 3, with MARCY -   Seen by nephrology     Diabetes -   BG controlled  On SSI     Hyperlipidemia - On statin     Tobacco use -  Counseled    UDS neg     Will need lifevest  Resources are an issue-thus case management aware and trying to assist     Resume coumadin   Heparin gtt  INR 1.1    If eliquis could be obtained that would be preferential    Wean off 02    Could discharge if has lifevest, eliquis and off 02 safely    Disposition :  Active Hospital Problems    Diagnosis     Constipation [K59.00]     Acute on chronic systolic (congestive) heart failure (HCC) [I50.23]     CKD (chronic kidney disease) stage 3, GFR 30-59 ml/min (HCC) [N18.30]     Cardiomyopathy (HCC) [I42.9]     WILSON (dyspnea on exertion) [R06.09]     Tobacco use [Z72.0]     Chronic anticoagulation [Z79.01]     Suspected pulmonary hypertension [R09.89]     Acute combined systolic and diastolic CHF, NYHA class 1 (HCC) [I50.41]     Pulmonary hypertension (HCC) [I27.20]     Hypertension [I10]     Elevated troponin [R79.89]        Subjective:  Doing fine  No new complaints      Review of systems:      Respiratory: denies SOB, cough  Cardiovascular: denies chest pain, palpitations        Vital signs/Intake and Output:  Visit Vitals  BP (!) 144/89   Pulse 61   Temp 
    Hospitalist Progress Note    Patient: Benjamin Alcocer MRN: [id number]  CSN: [id number]    YOB: 1970  Age: 53 y.o.  Sex: male    DOA: 1/8/2024 LOS:  LOS: 2 days          Chief Complaint:    SOB      Assessment/Plan        1.  Dyspnea on exertion. Due to acute on chronic systolic CHF  2.  Cardiomyopathy, chronic. BUT EF now 15-20% on echo  3.  History of left ventricular thrombus.  4.  Hypertension.  5.  Chronic kidney disease. Stage 3  6.  Diabetes. BG controlled  7.  Hyperlipidemia.     Tobacco cess counselled  UDS neg     Cr stable  Daily lasix, can reduce dose, volume status controlled  Continue med plan as doing     VQ is neg for PE     Heparin gtt and hold coumadin for cath now     Cardiac cath planned    Updated Ms Shelley, girlfriend in room    Disposition :  Active Hospital Problems    Diagnosis     Acute on chronic systolic (congestive) heart failure (HCC) [I50.23]     CKD (chronic kidney disease) stage 3, GFR 30-59 ml/min (HCC) [N18.30]     Cardiomyopathy (HCC) [I42.9]     WILSON (dyspnea on exertion) [R06.09]     Tobacco use [Z72.0]     Chronic anticoagulation [Z79.01]     Hypertension [I10]     Elevated troponin [R79.89]        Subjective:  Sleeping today    GF at bedside  No nursing issues reported    Review of systems:    As above      Vital signs/Intake and Output:  Visit Vitals  /87   Pulse 93   Temp 97.6 °F (36.4 °C) (Oral)   Resp 18   Ht 1.778 m (5' 10\")   Wt 91.2 kg (201 lb)   SpO2 96%   BMI 28.84 kg/m²     Current Shift:  No intake/output data recorded.  Last three shifts:  01/08 1901 - 01/10 0700  In: 375 [P.O.:375]  Out: -     Exam:    General: Well developed, NAD  CVS:Regular rate and rhythm, no M/R/G, S1/S2 heard, no thrill  Lungs:Clear to auscultation bilaterally, no wheezes, rhonchi, or rales  Extremities: No C/C/E  Neuro:grossly normal     Labs: Results:       Chemistry Recent Labs     01/08/24  1514 01/09/24  0531 01/10/24  0223   GLUCOSE 145* 136* 138*    141 139   K 
    Hospitalist Progress Note    Patient: Benjamin Alcocer MRN: [id number]  CSN: [id number]    YOB: 1970  Age: 53 y.o.  Sex: male    DOA: 1/8/2024 LOS:  LOS: 8 days          Chief Complaint:    SOB      Assessment/Plan       Dyspnea on exertion. Due to acute on chronic systolic CHF. Improved.   VQ is neg for PE.     Cardiomyopathy, chronic.  EF  15-20% on echo. Seen by cardiology  Cath done  Has pulm HTN and coronary disease     Acute on chronic CHF with Severely reduced ejection fraction 15% to 20% -   On Lasix  Pulmonary hypertension  Mild to moderate MR     Constipation-resolved     History of left ventricular thrombus      Hypertension -   stable     Chronic kidney disease. Stage 3, with MARCY -   Monitor Cr daily-resume BMP  Seen by nephrology     Diabetes -   BG controlled  On SSI     Hyperlipidemia - On statin     Tobacco use -  Counseled    UDS neg    Will need lifevest  Resources are an issue-thus case management aware and trying to assist    Resume coumadin if recommended by cardiology      Disposition :  Active Hospital Problems    Diagnosis     Constipation [K59.00]     Acute on chronic systolic (congestive) heart failure (HCC) [I50.23]     CKD (chronic kidney disease) stage 3, GFR 30-59 ml/min (HCC) [N18.30]     Cardiomyopathy (HCC) [I42.9]     WILSON (dyspnea on exertion) [R06.09]     Tobacco use [Z72.0]     Chronic anticoagulation [Z79.01]     Suspected pulmonary hypertension [R09.89]     Acute combined systolic and diastolic CHF, NYHA class 1 (HCC) [I50.41]     Pulmonary hypertension (HCC) [I27.20]     Hypertension [I10]     Elevated troponin [R79.89]        Subjective:    No new complaints  Reported BM  No CP or SOB  Awaits lifevest    Review of systems:      Respiratory: denies SOB, cough  Cardiovascular: denies chest pain, palpitations        Vital signs/Intake and Output:  Visit Vitals  BP (!) 141/90   Pulse 65   Temp 97.4 °F (36.3 °C) (Oral)   Resp 20   Ht 1.778 m (5' 10\")   Wt 90.7 kg (200 
  Hospitalist Progress Note    Patient: Benjamin Alcocer MRN: [id number]  CSN: [id number]    YOB: 1970  Age: 53 y.o.  Sex: male    DOA: 1/8/2024 LOS:  LOS: 5 days            Assessment/Plan     Active Hospital Problems    Diagnosis     Acute on chronic systolic (congestive) heart failure (HCC) [I50.23]     CKD (chronic kidney disease) stage 3, GFR 30-59 ml/min (HCC) [N18.30]     Cardiomyopathy (HCC) [I42.9]     WILSON (dyspnea on exertion) [R06.09]     Tobacco use [Z72.0]     Chronic anticoagulation [Z79.01]     Suspected pulmonary hypertension [R09.89]     Hypertension [I10]     Elevated troponin [R79.89]       Chief complaint :  SOB    Dyspnea on exertion. Due to acute on chronic systolic CHF. Improved. VQ is neg for PE.    Cardiomyopathy, chronic. BUT EF now 15-20% on echo. Seen by cardiology  Cardiac cath planned when renal function and INR allow    Acute on chronic CHF with Severely reduced ejection fraction 15% to 20%: On Lasix  Pulmonary hypertension  Mild to moderate MR    History of left ventricular thrombus: Heparin gtt and hold coumadin for cath now, INR 2.0    Hypertension: hydralazine prn for uncontrolled BP    Chronic kidney disease. Stage 3, with MARCY: Monitor Cr daily  Hold diuretics. Seen by nephrology  Repelte potassium PO    Diabetes. BG controlled    Hyperlipidemia: On statin     Tobacco cess counseled: UDS neg    Disposition : 1-2 days    TIME: E/M Time spent with patient and patient care issues: [] 31-40 mins  [] 41-49 mins  [x] 50 mins or more.     CC:    SOB        Subjective:     Pt was seen and examined with the nurse in the morning round.     \" My breathing is better\"    Review of systems  General: No fevers or chills.  Cardiovascular: No chest pain or pressure. No palpitations.   Pulmonary: + cough, SOB  Gastrointestinal: No nausea, vomiting.     Objective:      Visit Vitals  BP (!) 144/100   Pulse 72   Temp 97.6 °F (36.4 °C) (Oral)   Resp 20   Ht 1.778 m (5' 10\")   Wt 91.2 kg (201 
  Hospitalist Progress Note    Patient: Benjamin Alcocer MRN: [id number]  CSN: [id number]    YOB: 1970  Age: 53 y.o.  Sex: male    DOA: 1/8/2024 LOS:  LOS: 7 days                Assessment/Plan     Active Hospital Problems    Diagnosis     Constipation [K59.00]     Acute on chronic systolic (congestive) heart failure (HCC) [I50.23]     CKD (chronic kidney disease) stage 3, GFR 30-59 ml/min (HCC) [N18.30]     Cardiomyopathy (HCC) [I42.9]     WILSON (dyspnea on exertion) [R06.09]     Tobacco use [Z72.0]     Chronic anticoagulation [Z79.01]     Suspected pulmonary hypertension [R09.89]     Acute combined systolic and diastolic CHF, NYHA class 1 (HCC) [I50.41]     Pulmonary hypertension (HCC) [I27.20]     Hypertension [I10]     Elevated troponin [R79.89]         Chief complaint :  SOB  Dyspnea on exertion. Due to acute on chronic systolic CHF. Improved. VQ is neg for PE.     Cardiomyopathy, chronic.  EF  15-20% on echo. Seen by cardiology  Cardiac cathtoday     Acute on chronic CHF with Severely reduced ejection fraction 15% to 20% -   On Lasix  Pulmonary hypertension  Mild to moderate MR     Constipation -  Add Fleet enema, change Senokot  to senocolace daily     History of left ventricular thrombus -   Heparin gtt and hold coumadin for cath now, INR 2.0     Hypertension -   hydralazine prn for uncontrolled BP     Chronic kidney disease. Stage 3, with MARCY -   Monitor Cr daily  Hold diuretics. Seen by nephrology  Repelte potassium PO     Diabetes -   BG controlled  On SSI     Hyperlipidemia - On statin     Tobacco use -  Counseled    UDS neg    Disposition : 1-2 days    Review of systems  General: No fevers or chills.  Cardiovascular: No chest pain or pressure. No palpitations.   Pulmonary: No shortness of breath.   Gastrointestinal: No nausea, vomiting.     Physical Exam:  General: Awake, cooperative, no acute distress    HEENT: NC, Atraumatic.  PERRLA, anicteric sclerae.  Lungs: CTA Bilaterally. No 
  Warfarin Dosing - Pharmacy Consult Note    Consulting Provider: Dr. Sterling      Indication:   h/o left ventricular thrombus    Warfarin Dose prior to admission: Warfarin 7.5 mg po daily     Concurrent anticoagulants/antiplatelets: Heparin Drip    Recent Labs     01/08/24  1514 01/09/24  0531   INR 1.4* 1.4*   HGB 11.8* 11.2*    233   LABALBU 2.9*  --      Recent warfarin administrations                     warfarin (COUMADIN) tablet 7.5 mg (mg) 7.5 mg Given 01/08/24 0106                    Assessment/Plan  (Goal INR: 2 - 3)      Warfarin 7.5 mg po today at 18:00    Active problem list reviewed.  INR orders are placed.  Chart reviewed for pertinent labs, drug/diet interactions, and past doses.  Documentation of patient's clinical condition was reviewed.    Pharmacy Dosing:  Pharmacy will continue to follow.      
-Discharge documents reviewed with the patient.  -Questions encouraged and answered.  -Patient needs LifeVest prior to leaving.   -LifeVest was approved. Awaiting representative for fitting.    
-Patient was fitted with Life Vest.   -Patient's PIV removed.  -Patient left floor via wheelchair with belongings and family member.  
0408 - /97 - Dr. Piter Troncoso paged to make aware  Received orders   
1350: Patient transported to Cath lab by bed with Heparin drip running    1511: Patient off unit for VS, BGL, and reassessment. Will reassess when patient returns to unit.    1905: This RN gave report to SAUL Fam. The patient is still off the floor in the Care Unit. Shift report included SBAR, MAR, and post cath procedure plan. Per SAUL Huynh patient is to arrive on the unit around 1945.  
1740 pt returned to care unit post cardiac cath,  pt alert, resp still with effort,  02 intact 2l. Tr band intact to right wrist area, site clean,  sheath intact to right anticub area, site clean dry and intact, neuro vasc assessment of right arm WNL.   Ivf set at 25cc hr, to run for 12hrs  Dr Fermín mcgarry pt and talk with pts sig other  Thea from [facility] notified about obtaining pt life vest  
1840 sheath removed from right anticub area kee pressure to site until hemostasis obtained,  2x2 and Tegaderm applied,  tr band removed from right wrist area, no active drainage noted, area covered with 2x2 and Tegaderm  neuro vasc assessment of right arn and hand WNL  Spoke with Tessie Arrieta RN on [facility] . Report given  Bp remains high,  bp cuff readjust and placed to left lower arm,  still remains high. Medicated for bp  1945 pt to room 346 via bed by 2 nurses,  dressing to right wrist and right anticub area remain clean dry and intact, neruo vasc assessment of right hand and arm remain clean dry and intact  TRANSFER - OUT REPORT:    Verbal report given to Tessie Arrieta RN on Benjamin Alcocer  being transferred to [facility] for routine progression of patient care       Report consisted of patient's Situation, Background, Assessment and   Recommendations(SBAR).     Information from the following report(s) Surgery Report was reviewed with the receiving nurse.           Lines:   Peripheral IV 01/08/24 Left Antecubital (Active)   Site Assessment Clean, dry & intact 01/15/24 1429   Line Status Blood return noted;Flushed 01/15/24 1429   Line Care Connections checked and tightened 01/15/24 1215   Phlebitis Assessment No symptoms 01/15/24 1429   Infiltration Assessment 0 01/15/24 1215   Alcohol Cap Used Yes 01/15/24 1215   Dressing Status Clean, dry & intact 01/15/24 1215   Dressing Type Transparent 01/15/24 1215        Opportunity for questions and clarification was provided.      Patient transported with:  Monitor and Registered Nurse           
2340:  In patient room to draw blood for aPTT.  Reassessment completed and patient notably SOB at rest.  Lungs diminished.  Placed patient on 2LNC.  Blood drawn and sent to lab.    0015:  In room to re-check patient's 02 sats.  O2 sats = 97% on 2LNC, patient denies dyspnea at rest.  Lung sounds remain diminished.  No further needs at this time.      0034:  aPTT therapeutic.  No change to Heparin drip rate.  Nest aPTT will be 0630.  
Bedside and Verbal shift change report given to BRIGITTE Ramsey RN (oncoming nurse) by SERENA Mejia RN (offgoing nurse). Report included the following information Index, Intake/Output, MAR, and Recent Results.     
Bedside and Verbal shift change report given to Clementine Florence RN (oncoming nurse) by SAUL Dumas (offgoing nurse). Report included the following information Intake/Output, MAR, Recent Results, and Cardiac Rhythm SR .     
Bedside and Verbal shift change report given to SAUL Lane (oncoming nurse) by Clementine Florence RN (offgoing nurse). Report included the following information Intake/Output, MAR, Recent Results, and Cardiac Rhythm SR .     
Cardiology Progress Note        Patient: Benjamin Alcocer        Sex: male          DOA: 1/8/2024  YOB: 1970      Age:  53 y.o.        LOS:  LOS: 1 day   Assessment/Plan     Principal Problem:    WILSON (dyspnea on exertion)  Active Problems:    Hypertension    Elevated troponin    Cardiomyopathy (HCC)    Tobacco use    Chronic anticoagulation  Resolved Problems:    * No resolved hospital problems. *      Plan:    Acute on chronic CHF  Severely reduced ejection fraction 15% to 20%  Pulmonary hypertension  Mild to moderate MR      Above finding is reviewed in detail with the patient  Will consider right heart and left heart catheterization if INR is stable and kidney function is stable  Hold Coumadin  Increase Lasix  Fluid restriction 1800 mL/day  Salt restriction 2 to 3 g/day I will reassess INR tomorrow                ECHO (TTE) COMPLETE (PRN CONTRAST/BUBBLE/STRAIN/3D) 01/09/2024  5:40 PM (Final)    Interpretation Summary    Left Ventricle: Severely reduced left ventricular systolic function with a visually estimated EF of 15 - 20%. Left ventricle is severely dilated. Moderately increased wall thickness. Severe global hypokinesis present. Grade II diastolic dysfunction with increased LAP. Tissue Doppler velocity is reduced.    Right Ventricle: Right ventricle is moderately dilated. Moderately reduced systolic function.    Aortic Valve: Trileaflet valve. Thickened cusp. Calcified cusp. Mild regurgitation.    Mitral Valve: Mild to moderate regurgitation.    Tricuspid Valve: Moderate regurgitation. The estimated RVSP is 75 mmHg.    Left Atrium: Left atrium is severely dilated. Left atrial volume index is severely increased (>48 mL/m2).    Right Atrium: Right atrium is severely dilated. The right atrial volume is severely increased.    Aorta: Normal sized aortic root. Mildly dilated ascending aorta. Ao ascending diameter is 3.8 cm.    Pericardium: Small (<1 cm) pericardial effusion present. No indication of 
Cardiology Progress Note        Patient: Benjamin Alcocer        Sex: male          DOA: 1/8/2024  YOB: 1970      Age:  53 y.o.        LOS:  LOS: 2 days   Assessment/Plan     Principal Problem:    WILSON (dyspnea on exertion)  Active Problems:    Hypertension    Elevated troponin    Cardiomyopathy (HCC)    Tobacco use    Chronic anticoagulation    Acute on chronic systolic (congestive) heart failure (HCC)    CKD (chronic kidney disease) stage 3, GFR 30-59 ml/min ([facility])  Resolved Problems:    * No resolved hospital problems. *      Plan:  1/10/2024  INR is 1.7  Patient have already have ate lunch  Creatinine is 1.9, consider nephrology consult before cath  Possible cardiac catheterization tomorrow if INR and kidney function stable  Discussed in detail with the patient and wife, both of them agreed.  Risk, benefits and alternatives of all kind of complication discussed both of them agreed to proceed.      Acute on chronic CHF  Severely reduced ejection fraction 15% to 20%  Pulmonary hypertension  Mild to moderate MR      Above finding is reviewed in detail with the patient  Will consider right heart and left heart catheterization if INR is stable and kidney function is stable  Hold Coumadin  Increase Lasix  Fluid restriction 1800 mL/day  Salt restriction 2 to 3 g/day I will reassess INR tomorrow                ECHO (TTE) COMPLETE (PRN CONTRAST/BUBBLE/STRAIN/3D) 01/09/2024  5:40 PM (Final)    Interpretation Summary    Left Ventricle: Severely reduced left ventricular systolic function with a visually estimated EF of 15 - 20%. Left ventricle is severely dilated. Moderately increased wall thickness. Severe global hypokinesis present. Grade II diastolic dysfunction with increased LAP. Tissue Doppler velocity is reduced.    Right Ventricle: Right ventricle is moderately dilated. Moderately reduced systolic function.    Aortic Valve: Trileaflet valve. Thickened cusp. Calcified cusp. Mild regurgitation.    Mitral 
Cardiology Progress Note        Patient: Benjamin Alcocer        Sex: male          DOA: 1/8/2024  YOB: 1970      Age:  53 y.o.        LOS:  LOS: 3 days   Assessment/Plan     Principal Problem:    WILSON (dyspnea on exertion)  Active Problems:    Hypertension    Elevated troponin    Cardiomyopathy (HCC)    Tobacco use    Chronic anticoagulation    Acute on chronic systolic (congestive) heart failure (HCC)    CKD (chronic kidney disease) stage 3, GFR 30-59 ml/min ([facility])  Resolved Problems:    * No resolved hospital problems. *      Plan:  1/11/2024  Patient is seen by nephrology  Creatinine is 2.0  Will consider cardiac catheterization tomorrow if kidney function remains stable  Discussed with patient  Continue with heparin        Creatinine is 1.9, consider nephrology consult before cath  Possible cardiac catheterization tomorrow if INR and kidney function stable  Discussed in detail with the patient and wife, both of them agreed.  Risk, benefits and alternatives of all kind of complication discussed both of them agreed to proceed.      Acute on chronic CHF  Severely reduced ejection fraction 15% to 20%  Pulmonary hypertension  Mild to moderate MR      Above finding is reviewed in detail with the patient  Will consider right heart and left heart catheterization if INR is stable and kidney function is stable  Hold Coumadin  Increase Lasix  Fluid restriction 1800 mL/day  Salt restriction 2 to 3 g/day I will reassess INR tomorrow                ECHO (TTE) COMPLETE (PRN CONTRAST/BUBBLE/STRAIN/3D) 01/09/2024  5:40 PM (Final)    Interpretation Summary    Left Ventricle: Severely reduced left ventricular systolic function with a visually estimated EF of 15 - 20%. Left ventricle is severely dilated. Moderately increased wall thickness. Severe global hypokinesis present. Grade II diastolic dysfunction with increased LAP. Tissue Doppler velocity is reduced.    Right Ventricle: Right ventricle is moderately dilated. 
Cardiology Progress Note        Patient: Benjamin Alcocer        Sex: male          DOA: 1/8/2024  YOB: 1970      Age:  53 y.o.        LOS:  LOS: 4 days   Assessment/Plan     Principal Problem:    WILSON (dyspnea on exertion)  Active Problems:    Suspected pulmonary hypertension    Hypertension    Elevated troponin    Cardiomyopathy (HCC)    Tobacco use    Chronic anticoagulation    Acute on chronic systolic (congestive) heart failure (HCC)    CKD (chronic kidney disease) stage 3, GFR 30-59 ml/min (HCC)  Resolved Problems:    * No resolved hospital problems. *      Plan:  1/12/2024  Case discussed in detail with Dr. Lopez who recommended considering cath procedure on Monday and in the meantime will slowly do gentle hydration  Patient agreed with the plan  Continue rest of the medications      1/11/2024  Patient is seen by nephrology  Creatinine is 2.0  Will consider cardiac catheterization tomorrow if kidney function remains stable  Discussed with patient  Continue with heparin        Creatinine is 1.9, consider nephrology consult before cath  Possible cardiac catheterization tomorrow if INR and kidney function stable  Discussed in detail with the patient and wife, both of them agreed.  Risk, benefits and alternatives of all kind of complication discussed both of them agreed to proceed.      Acute on chronic CHF  Severely reduced ejection fraction 15% to 20%  Pulmonary hypertension  Mild to moderate MR      Above finding is reviewed in detail with the patient  Will consider right heart and left heart catheterization if INR is stable and kidney function is stable  Hold Coumadin  Increase Lasix  Fluid restriction 1800 mL/day  Salt restriction 2 to 3 g/day I will reassess INR tomorrow                ECHO (TTE) COMPLETE (PRN CONTRAST/BUBBLE/STRAIN/3D) 01/09/2024  5:40 PM (Final)    Interpretation Summary    Left Ventricle: Severely reduced left ventricular systolic function with a visually estimated EF of 15 
Cardiology Progress Note        Patient: Benjamin Alcocer        Sex: male          DOA: 1/8/2024  YOB: 1970      Age:  53 y.o.        LOS:  LOS: 5 days   Assessment/Plan     Principal Problem:    WILSON (dyspnea on exertion)  Active Problems:    Suspected pulmonary hypertension    Hypertension    Elevated troponin    Cardiomyopathy (HCC)    Tobacco use    Chronic anticoagulation    Acute on chronic systolic (congestive) heart failure (HCC)    CKD (chronic kidney disease) stage 3, GFR 30-59 ml/min (HCC)  Resolved Problems:    * No resolved hospital problems. *      Plan:  1/13/2024  Tolerating gentle hydration  Continue antihypertensive treatment  Possible cath on Monday  Discussed with patient/significant other                1/12/2024  Case discussed in detail with Dr. Lopez who recommended considering cath procedure on Monday and in the meantime will slowly do gentle hydration  Patient agreed with the plan  Continue rest of the medications      1/11/2024  Patient is seen by nephrology  Creatinine is 2.0  Will consider cardiac catheterization tomorrow if kidney function remains stable  Discussed with patient  Continue with heparin        Creatinine is 1.9, consider nephrology consult before cath  Possible cardiac catheterization tomorrow if INR and kidney function stable  Discussed in detail with the patient and wife, both of them agreed.  Risk, benefits and alternatives of all kind of complication discussed both of them agreed to proceed.      Acute on chronic CHF  Severely reduced ejection fraction 15% to 20%  Pulmonary hypertension  Mild to moderate MR      Above finding is reviewed in detail with the patient  Will consider right heart and left heart catheterization if INR is stable and kidney function is stable  Hold Coumadin  Increase Lasix  Fluid restriction 1800 mL/day  Salt restriction 2 to 3 g/day I will reassess INR tomorrow                ECHO (TTE) COMPLETE (PRN CONTRAST/BUBBLE/STRAIN/3D) 
Cardiology Progress Note        Patient: Benjamin Alcocer        Sex: male          DOA: 1/8/2024  YOB: 1970      Age:  53 y.o.        LOS:  LOS: 6 days   Assessment/Plan     Principal Problem:    WILSON (dyspnea on exertion)  Active Problems:    Suspected pulmonary hypertension    Hypertension    Elevated troponin    Cardiomyopathy (HCC)    Tobacco use    Chronic anticoagulation    Acute on chronic systolic (congestive) heart failure (HCC)    CKD (chronic kidney disease) stage 3, GFR 30-59 ml/min (HCC)    Constipation  Resolved Problems:    * No resolved hospital problems. *      Plan:  1/14/2024  Creatinine 2.09  Tolerating gentle hydration  Continue antihypertensive treatment  Possible cath on Monday  Discussed with patient/significant other                1/12/2024  Case discussed in detail with Dr. Lopez who recommended considering cath procedure on Monday and in the meantime will slowly do gentle hydration  Patient agreed with the plan  Continue rest of the medications      1/11/2024  Patient is seen by nephrology  Creatinine is 2.0  Will consider cardiac catheterization tomorrow if kidney function remains stable  Discussed with patient  Continue with heparin        Creatinine is 1.9, consider nephrology consult before cath  Possible cardiac catheterization tomorrow if INR and kidney function stable  Discussed in detail with the patient and wife, both of them agreed.  Risk, benefits and alternatives of all kind of complication discussed both of them agreed to proceed.      Acute on chronic CHF  Severely reduced ejection fraction 15% to 20%  Pulmonary hypertension  Mild to moderate MR      Above finding is reviewed in detail with the patient  Will consider right heart and left heart catheterization if INR is stable and kidney function is stable  Hold Coumadin  Increase Lasix  Fluid restriction 1800 mL/day  Salt restriction 2 to 3 g/day I will reassess INR tomorrow                ECHO (TTE) COMPLETE 
Cardiology Progress Note        Patient: Benjamin Alcocer        Sex: male          DOA: 1/8/2024  YOB: 1970      Age:  53 y.o.        LOS:  LOS: 7 days   Assessment/Plan     Principal Problem:    WILSON (dyspnea on exertion)  Active Problems:    Suspected pulmonary hypertension    Acute combined systolic and diastolic CHF, NYHA class 1 (HCC)    Pulmonary hypertension (HCC)    Hypertension    Elevated troponin    Cardiomyopathy (HCC)    Tobacco use    Chronic anticoagulation    Acute on chronic systolic (congestive) heart failure (HCC)    CKD (chronic kidney disease) stage 3, GFR 30-59 ml/min (HCC)    Constipation  Resolved Problems:    * No resolved hospital problems. *      Plan:  1/15/2024  Cardiac telemetry stable  No chest pain  Mild dyspnea on exertion  Plan is to do right and left heart cath possible PCI  Risk, benefits and alternatives were discussed in detail patient agreed to proceed  Discussed with patient elevated risk of contrast nephropathy.          1/14/2024  Creatinine 2.09  Tolerating gentle hydration  Continue antihypertensive treatment  Possible cath on Monday  Discussed with patient/significant other                1/12/2024  Case discussed in detail with Dr. Lopez who recommended considering cath procedure on Monday and in the meantime will slowly do gentle hydration  Patient agreed with the plan  Continue rest of the medications      1/11/2024  Patient is seen by nephrology  Creatinine is 2.0  Will consider cardiac catheterization tomorrow if kidney function remains stable  Discussed with patient  Continue with heparin        Creatinine is 1.9, consider nephrology consult before cath  Possible cardiac catheterization tomorrow if INR and kidney function stable  Discussed in detail with the patient and wife, both of them agreed.  Risk, benefits and alternatives of all kind of complication discussed both of them agreed to proceed.      Acute on chronic CHF  Severely reduced ejection 
Cardiology Progress Note        Patient: Benjamin Alcocer        Sex: male          DOA: 1/8/2024  YOB: 1970      Age:  53 y.o.        LOS:  LOS: 9 days   Assessment/Plan     Principal Problem:    WILSON (dyspnea on exertion)  Active Problems:    Suspected pulmonary hypertension    Acute combined systolic and diastolic CHF, NYHA class 1 (HCC)    Pulmonary hypertension (HCC)    Hypertension    Elevated troponin    Cardiomyopathy (HCC)    Tobacco use    Chronic anticoagulation    Acute on chronic systolic (congestive) heart failure (HCC)    CKD (chronic kidney disease) stage 3, GFR 30-59 ml/min (HCC)    Constipation  Resolved Problems:    * No resolved hospital problems. *      Plan:  No new complaints  Cardiac telemetry stable  Management plan discussed again with the patient and other significant  Discussed with patient option of switching warfarin to Eliquis  Patient will need follow-up in [facility] hypertension [facility].  Continue with current meds      Cardiac telemetry stable  LVEDP is high risk  Patient is back on Bumex  Patient will need follow-up in [facility] pulmonary hypertension [facility]  Management plan discussed with patient and family    Mild to moderate  nonobstructive coronary artery disease.  Mid RCA 40% to 50 stenosis. Very distal LAD have 20-30 multiple lesions.  LVEDP 35 mmHg.    PCWP 29 mmHg A wave with 31 mmHg. V-wave is 31 mmHg  PA pressure 83/35 mm Hg mean PA pressure 56 mmHg.     PVR is 8.02 to [facility] unit.     Final impression:     Severe nonischemic cardiomyopathy most likely hypertensive heart disease.  Severe pulmonary hypertension mean PA pressure 56 mm of mercury   Mild to moderate coronary artery disease  Discussed with patient and family significant about aggressive risk factor management.  We will consider referring patient to [facility] hypertension [facility]..                                   1/15/2024  Cardiac 
Patient had uneventful shift overnight.      Bedside and Verbal shift change report given to NIKUNJ Arrieta RN (oncoming nurse) by SERENA Mejia RN (offgoing nurse). Report included the following information Intake/Output, MAR, and Recent Results.     
Pt prepped and ready for cath,  placed on 1l o2 for comfort    o2 sat 95  pt resting comfortably in semi fowlers position  
Pulse oximetry assessment   -99% at rest on room air.  -92% while ambulating on room air.      
RENAL CONSULT PROGRESS NOTE   1/15/2024    Patient:  Benjamin Alcocer  :  1970  Gender:  male  MRN #:  [id number]    Reason for Consult: management of CKD and CHF     SUBJECTIVE:   reports urinating fine  Reports shortness of breath with movement and activity   Trace edema   No other concern     Objective:    BP (!) 138/95   Pulse 66   Temp 97.5 °F (36.4 °C) (Oral)   Resp 17   Ht 1.778 m (5' 10\")   Wt 90.7 kg (200 lb)   SpO2 100%   BMI 28.70 kg/m²     Physical Exam:    Pt awake,  alert and comfortable  Ext: trace edema and pulsation intact  CNS- Oriented to time , place and person     Laboratory Data:    Lab Results   Component Value Date    BUN 33 (H) 2024    BUN 29 (H) 2024    BUN 32 (H) 2024     2024     2024     2024    CO2 24 2024    CO2 24 2024    CO2 24 2024    GLUCOSE 110 (H) 2024    GLUCOSE 131 (H) 2024    GLUCOSE 135 (H) 2024     Lab Results   Component Value Date    WBC 4.8 2024    HGB 10.5 (L) 2024    HCT 33.6 (L) 2024     Lab Results   Component Value Date    CALCIUM 8.4 (L) 2024     Lab Results   Component Value Date    HDL 33 (L) 01/10/2024     No results found for: \"SPECIMENTYP\", \"TURBIDITY\"    Imaging Reveiwed:      IMPRESSION:  Cardiomegaly and bibasilar atelectasis.        Assessment:   Benjamin Alcocer is a 53 y.o. year old male  with ongoing Hypertension, diabetes , CHF , CKD stage 3GB presented with progressive shortness of breath   Admitted for CHF , treated with lasix   ECHO showed very low EF    Upon review of renal trajectory he has CKD for many years creatinine ranging 2 to 2.4 mg/dl from -    He now has severely reduced EF 15- 20 %  and pulmonary hypertension   CKD is at baseline     CKD stage 3GB  Hypertension  CHF with low EF  Pulmonary HTN  Diabetes  Hypokalemia        Plan:    Patient examined . Renal function is pending this morning .   Discussed the risk 
RENAL CONSULT PROGRESS NOTE   2024    Patient:  Benjamin Alcocer  :  1970  Gender:  male  MRN #:  [id number]    Reason for Consult: management of CKD and CHF     SUBJECTIVE:   reports urinating finem, output not much   Reports shortness of breath with movement and activity   Trace edema   No other concern     Objective:    BP (!) 141/90   Pulse 65   Temp 97.4 °F (36.3 °C) (Oral)   Resp 20   Ht 1.778 m (5' 10\")   Wt 90.7 kg (200 lb)   SpO2 99%   BMI 28.70 kg/m²     Physical Exam:    Pt awake,  alert and comfortable  Ext: trace edema and pulsation intact  CNS- Oriented to time , place and person     Laboratory Data:    Lab Results   Component Value Date    BUN 29 (H) 2024    BUN 30 (H) 01/15/2024    BUN 33 (H) 2024     2024     01/15/2024     2024    CO2 23 2024    CO2 24 01/15/2024    CO2 24 2024    GLUCOSE 195 (H) 2024    GLUCOSE 121 (H) 01/15/2024    GLUCOSE 110 (H) 2024     Lab Results   Component Value Date    WBC 4.6 2024    HGB 10.2 (L) 2024    HCT 33.0 (L) 2024     Lab Results   Component Value Date    CALCIUM 8.6 2024     Lab Results   Component Value Date    HDL 33 (L) 01/10/2024     No results found for: \"SPECIMENTYP\", \"TURBIDITY\"    Imaging Reveiwed:      IMPRESSION:  Cardiomegaly and bibasilar atelectasis.        Assessment:   Benjamin Alcocer is a 53 y.o. year old male  with ongoing Hypertension, diabetes , CHF , CKD stage 3GB presented with progressive shortness of breath   Admitted for CHF , treated with lasix   ECHO showed very low EF    Upon review of renal trajectory he has CKD for many years creatinine ranging 2 to 2.4 mg/dl from -    He now has severely reduced EF 15- 20 %  and pulmonary hypertension   CKD is at baseline     CKD stage 3GB  Hypertension  CHF with low EF  Pulmonary HTN  Diabetes  Hypokalemia        Plan:    Patient examined . Renal function is pending this morning . He has 
RENAL CONSULT PROGRESS NOTE   2024    Patient:  Benjamin Alcocer  :  1970  Gender:  male  MRN #:  [id number]    Reason for Consult: management of CKD and CHF     SUBJECTIVE:   Feels okay  Reports shortness of breath with movement and activity   Trace edema   Constipation since Monday     Objective:    BP (!) 138/90   Pulse 73   Temp 97.5 °F (36.4 °C) (Oral)   Resp 18   Ht 1.778 m (5' 10\")   Wt 91.2 kg (201 lb)   SpO2 93%   BMI 28.84 kg/m²     Physical Exam:    Pt awake,  alert and comfortable  Ext: trace edema and pulsation intact  CNS- Oriented to time , place and person     Laboratory Data:    Lab Results   Component Value Date    BUN 29 (H) 2024    BUN 32 (H) 2024    BUN 29 (H) 2024     2024     2024     2024    CO2 24 2024    CO2 24 2024    CO2 23 2024    GLUCOSE 131 (H) 2024    GLUCOSE 135 (H) 2024    GLUCOSE 129 (H) 2024     Lab Results   Component Value Date    WBC 5.1 2024    HGB 10.8 (L) 2024    HCT 33.2 (L) 2024     Lab Results   Component Value Date    CALCIUM 8.6 2024     Lab Results   Component Value Date    HDL 33 (L) 01/10/2024     No results found for: \"SPECIMENTYP\", \"TURBIDITY\"    Imaging Reveiwed:      IMPRESSION:  Cardiomegaly and bibasilar atelectasis.        Assessment:   Benjamin Alcocer is a 53 y.o. year old male  with ongoing Hypertension, diabetes , CHF , CKD stage 3GB presented with progressive shortness of breath   Admitted for CHF , treated with lasix   ECHO showed very low EF    Upon review of renal trajectory he has CKD for many years creatinine ranging 2 to 2.4 mg/dl from -    He now has severely reduced EF 15- 20 %  and pulmonary hypertension   CKD is at baseline     CKD stage 3GB  Hypertension  CHF with low EF  Pulmonary HTN  Diabetes  Hypokalemia        Plan:    Patient examined and labs reviewed. Creatinine is 2.0 mg /dl   Discussed the risk of 
RENAL CONSULT PROGRESS NOTE   2024    Patient:  Benjamin Alcocer  :  1970  Gender:  male  MRN #:  [id number]    Reason for Consult: management of CKD and CHF     SUBJECTIVE:   Feels okay, reports urinating fine, but not recorded   Reports shortness of breath with movement and activity   Trace edema   Constipation    Objective:    BP (!) 141/80   Pulse 61   Temp 97.4 °F (36.3 °C) (Oral)   Resp 18   Ht 1.778 m (5' 10\")   Wt 91.2 kg (201 lb)   SpO2 94%   BMI 28.84 kg/m²     Physical Exam:    Pt awake,  alert and comfortable  Ext: trace edema and pulsation intact  CNS- Oriented to time , place and person     Laboratory Data:    Lab Results   Component Value Date    BUN 33 (H) 2024    BUN 29 (H) 2024    BUN 32 (H) 2024     2024     2024     2024    CO2 24 2024    CO2 24 2024    CO2 24 2024    GLUCOSE 110 (H) 2024    GLUCOSE 131 (H) 2024    GLUCOSE 135 (H) 2024     Lab Results   Component Value Date    WBC 4.8 2024    HGB 10.5 (L) 2024    HCT 33.6 (L) 2024     Lab Results   Component Value Date    CALCIUM 8.4 (L) 2024     Lab Results   Component Value Date    HDL 33 (L) 01/10/2024     No results found for: \"SPECIMENTYP\", \"TURBIDITY\"    Imaging Reveiwed:      IMPRESSION:  Cardiomegaly and bibasilar atelectasis.        Assessment:   Benjamin Alcocer is a 53 y.o. year old male  with ongoing Hypertension, diabetes , CHF , CKD stage 3GB presented with progressive shortness of breath   Admitted for CHF , treated with lasix   ECHO showed very low EF    Upon review of renal trajectory he has CKD for many years creatinine ranging 2 to 2.4 mg/dl from -    He now has severely reduced EF 15- 20 %  and pulmonary hypertension   CKD is at baseline     CKD stage 3GB  Hypertension  CHF with low EF  Pulmonary HTN  Diabetes  Hypokalemia        Plan:    Patient examined and labs reviewed. Creatinine is 2.0 mg 
RENAL CONSULT PROGRESS NOTE   2024    Patient:  Benjamin Alcocer  :  1970  Gender:  male  MRN #:  [id number]    Reason for Consult: management of CKD and CHF     SUBJECTIVE:   reports urinating fine, recorded output not much   Reports shortness of breath with exertion   Trace edema   No other concern     Objective:    BP (!) 149/91   Pulse 66   Temp 98.1 °F (36.7 °C) (Oral)   Resp 20   Ht 1.778 m (5' 10\")   Wt 90.7 kg (200 lb)   SpO2 100%   BMI 28.70 kg/m²     Physical Exam:    Pt awake,  alert and comfortable  Ext: trace edema and pulsation intact  CNS- Oriented to time , place and person     Laboratory Data:    Lab Results   Component Value Date    BUN 28 (H) 2024    BUN 29 (H) 2024    BUN 30 (H) 01/15/2024     2024     2024     01/15/2024    CO2 24 2024    CO2 23 2024    CO2 24 01/15/2024    GLUCOSE 138 (H) 2024    GLUCOSE 195 (H) 2024    GLUCOSE 121 (H) 01/15/2024     Lab Results   Component Value Date    WBC 4.6 2024    HGB 10.2 (L) 2024    HCT 33.0 (L) 2024     Lab Results   Component Value Date    CALCIUM 8.7 2024     Lab Results   Component Value Date    HDL 33 (L) 01/10/2024     No results found for: \"SPECIMENTYP\", \"TURBIDITY\"    Imaging Reveiwed:      IMPRESSION:  Cardiomegaly and bibasilar atelectasis.        Assessment:   Benjamin Alcocer is a 53 y.o. year old male  with ongoing Hypertension, diabetes , CHF , CKD stage 3GB presented with progressive shortness of breath   Admitted for CHF , treated with lasix   ECHO showed very low EF    Upon review of renal trajectory he has CKD for many years creatinine ranging 2 to 2.4 mg/dl from -    He now has severely reduced EF 15- 20 %  and pulmonary hypertension   CKD is at baseline     CKD stage 3GB  Hypertension  CHF with low EF  Pulmonary HTN  Diabetes  Hypokalemia        Plan:    Patient examined  and labs reviewed.  Renal function is stable g . Saeed 
RENAL CONSULT PROGRESS NOTE   2024    Patient:  Benjamin Alcocer  :  1970  Gender:  male  MRN #:  [id number]    Reason for Consult: management of CKD and CHF     SUBJECTIVE:   Feels okay  Reports shortness of breath with movement and activity   Trace edema   No other concern     Objective:    /89   Pulse 67   Temp 97.3 °F (36.3 °C) (Oral)   Resp 18   Ht 1.778 m (5' 10\")   Wt 91.2 kg (201 lb)   SpO2 93%   BMI 28.84 kg/m²     Physical Exam:    Pt awake,  alert and comfortable  Ext: trace edema and pulsation intact  Skin: No rash and erythema  CNS- Oriented to time , place and person     Laboratory Data:    Lab Results   Component Value Date    BUN 32 (H) 2024    BUN 29 (H) 2024    BUN 26 (H) 01/10/2024     2024     2024     01/10/2024    CO2 24 2024    CO2 23 2024    CO2 25 01/10/2024    GLUCOSE 135 (H) 2024    GLUCOSE 129 (H) 2024    GLUCOSE 138 (H) 01/10/2024     Lab Results   Component Value Date    WBC 5.1 2024    HGB 10.8 (L) 2024    HCT 33.2 (L) 2024     Lab Results   Component Value Date    CALCIUM 8.2 (L) 2024     Lab Results   Component Value Date    HDL 33 (L) 01/10/2024     No results found for: \"SPECIMENTYP\", \"TURBIDITY\"    Imaging Reveiwed:      IMPRESSION:  Cardiomegaly and bibasilar atelectasis.        Assessment:   Benjamin Alcocer is a 53 y.o. year old male  with ongoing Hypertension, diabetes , CHF , CKD stage 3GB presented with progressive shortness of breath   Admitted for CHF , treated with lasix   ECHO showed very low EF    Upon review of renal trajectory he has CKD for many years creatinine ranging 2 to 2.4 mg/dl from -    He now has severely reduced EF 15- 20 %  and pulmonary hypertension   CKD is at baseline     CKD stage 3GB  Hypertension  CHF with low EF  Pulmonary HTN  Diabetes  Hypokalemia        Plan:    Patient examined and labs reviewed. Creatinine is 2.1 mg /dl 
Report obtained from Tessie Arrieta RN  
Warfarin Dosing - Pharmacy Consult Note  Consulting Provider: Dr. Sterling    Indication:   h/o left ventricular thrombus  Warfarin Dose prior to admission: 7.5 mg po daily      Concurrent anticoagulants/antiplatelets: Heparin drip   Significant Drug Interactions: No obvious interactions    Recent Labs     01/08/24  1514   INR 1.4*   HGB 11.8*      LABALBU 2.9*     Assessment/Plan  (Goal INR: 2 - 3)  Warfarin 7.5 mg po tonight     Active problem list reviewed.  INR orders are placed.  Chart reviewed for pertinent labs, drug/diet interactions, and past doses.  Documentation of patient's clinical condition was reviewed.    Pharmacy Dosing:  Pharmacy will continue to follow.      
Warfarin Dosing - Pharmacy Consult Note  Consulting Provider: Dr. Sterling  Indication:  History of  VTE/PE and Atrial Fibrillation  Warfarin Dose prior to admission: 7.5mg   Concurrent anticoagulants/antiplatelets: heparin (currently stopped)  Significant Drug Interactions: No obvious interactions  Recent Labs     01/14/24  0200 01/15/24  0556 01/15/24  1344 01/16/24  0601   INR 1.2* 1.1  --   --    HGB 10.5*  --  11.5* 10.2*     --  [phone]     Recent warfarin administrations        No warfarin orders with administrations found.            Orders not given:            warfarin (COUMADIN) tablet 7.5 mg                   Date   INR    Dose  1/15/24   INR 1.1     Assessment/Plan  (Goal INR: 2 - 3)  Start warfarin 7.5mg at 1800 on 1/16/24    Active problem list reviewed.  INR orders are placed.  Chart reviewed for pertinent labs, drug/diet interactions, and past doses.  Documentation of patient's clinical condition was reviewed.    Pharmacy Dosing:  Pharmacy will continue to follow.      
Warfarin Dosing - Pharmacy Consult Note  Consulting Provider: Dr. Sterling  Indication:  History of  VTE/PE and Atrial Fibrillation  Warfarin Dose prior to admission: 7.5mg   Concurrent anticoagulants/antiplatelets: heparin (currently stopped)  Significant Drug Interactions: No obvious interactions  Recent Labs     01/14/24  0200 01/15/24  0556 01/15/24  1344 01/16/24  0601   INR 1.2* 1.1  --   --    HGB 10.5*  --  11.5* 10.2*     --  244 216     Recent warfarin administrations        No warfarin orders with administrations found.            Orders not given:            warfarin (COUMADIN) tablet 7.5 mg                  Assessment/Plan  (Goal INR: 2 - 3)  Give repeat warfarin 7.5mg at 1800 on 1/17/24    INR ordered for tomorrow AM  Active problem list reviewed.  INR orders are placed.  Chart reviewed for pertinent labs, drug/diet interactions, and past doses.  Documentation of patient's clinical condition was reviewed.    Pharmacy Dosing:  Pharmacy will continue to follow.        
  Temp 98 °F (36.7 °C) (Oral)   Resp 18   Ht 1.778 m (5' 10\")   Wt 91.2 kg (201 lb)   SpO2 96%   BMI 28.84 kg/m²     Current Shift:  No intake/output data recorded.  Last three shifts:  01/11 0701 - 01/12 1900  In: 1066.7 [I.V.:1066.7]  Out: 2100 [Urine:2100]            Labs: Results:       Chemistry Recent Labs     01/10/24  0223 01/11/24  0641 01/12/24  0359    140 139   K 3.6 3.4* 3.5    109 108   CO2 25 23 24   BUN 26* 29* 32*   ,No results found for: \"LACTA\"   CBC w/Diff Recent Labs     01/10/24  0223 01/11/24  0641 01/12/24  0359   WBC 5.5 4.7 5.1   RBC 4.38 4.45 4.18*   HGB 11.3* 11.4* 10.8*   HCT 35.1* 35.3* 33.2*    210 202      Cardiac Enzymes Lab Results   Component Value Date    TROPHS 91 (H) 01/09/2024   ,No results found for: \"BNP\"   Coagulation Recent Labs     01/11/24  0641 01/11/24  1532 01/11/24  2340 01/12/24  0359 01/12/24  0640   INR 2.0*  --   --  1.7*  --    APTT 69.6*   < > 84.5*  --  84.8*    < > = values in this interval not displayed.       Lipid Panel Lab Results   Component Value Date/Time    CHOL 98 01/10/2024 02:22 AM    HDL 33 01/10/2024 02:22 AM      Pancreas No results for input(s): \"LIPASE\" in the last 72 hours.,No results for input(s): \"AMYLASE\" in the last 72 hours.   Liver Enzymes No results for input(s): \"TP\", \"ALB\" in the last 72 hours.    Invalid input(s): \"TBIL\", \"AP\", \"SGOT\", \"GPT\", \"DBIL\"   Thyroid Studies No results found for: \"T4\", \"T3RU\", \"TSH\"     Procedures/imaging: see electronic medical records for all procedures/Xrays and details which were not copied into this note but were reviewed prior to creation of Plan    TIME: E/M Time spent with patient and patient care issues: [] 31-40 mins  [x] 41-49 mins  [] 50 mins or more.     This time also includes physician non-face-to-face service time visit on the date of service such as  Preparing to see the patient (eg, review of tests)  Obtaining and/or reviewing separately obtained history  Performing a 
2.09  Tolerating gentle hydration  Continue antihypertensive treatment  Possible cath on Monday  Discussed with patient/significant other                1/12/2024  Case discussed in detail with Dr. Lopez who recommended considering cath procedure on Monday and in the meantime will slowly do gentle hydration  Patient agreed with the plan  Continue rest of the medications      1/11/2024  Patient is seen by nephrology  Creatinine is 2.0  Will consider cardiac catheterization tomorrow if kidney function remains stable  Discussed with patient  Continue with heparin        Creatinine is 1.9, consider nephrology consult before cath  Possible cardiac catheterization tomorrow if INR and kidney function stable  Discussed in detail with the patient and wife, both of them agreed.  Risk, benefits and alternatives of all kind of complication discussed both of them agreed to proceed.      Acute on chronic CHF  Severely reduced ejection fraction 15% to 20%  Pulmonary hypertension  Mild to moderate MR      Above finding is reviewed in detail with the patient  Will consider right heart and left heart catheterization if INR is stable and kidney function is stable  Hold Coumadin  Increase Lasix  Fluid restriction 1800 mL/day  Salt restriction 2 to 3 g/day I will reassess INR tomorrow                ECHO (TTE) COMPLETE (PRN CONTRAST/BUBBLE/STRAIN/3D) 01/09/2024  5:40 PM (Final)    Interpretation Summary    Left Ventricle: Severely reduced left ventricular systolic function with a visually estimated EF of 15 - 20%. Left ventricle is severely dilated. Moderately increased wall thickness. Severe global hypokinesis present. Grade II diastolic dysfunction with increased LAP. Tissue Doppler velocity is reduced.    Right Ventricle: Right ventricle is moderately dilated. Moderately reduced systolic function.    Aortic Valve: Trileaflet valve. Thickened cusp. Calcified cusp. Mild regurgitation.    Mitral Valve: Mild to moderate 
details which were not copied into this note but were reviewed prior to creation of Plan      Medications Reviewed  Maria De Jesus Pak MD

## 2024-07-30 ENCOUNTER — HOSPITAL ENCOUNTER (EMERGENCY)
Facility: HOSPITAL | Age: 54
Discharge: HOME | End: 2024-07-30
Attending: EMERGENCY MEDICINE
Payer: COMMERCIAL

## 2024-07-30 ENCOUNTER — HOSPITAL ENCOUNTER (EMERGENCY)
Facility: HOSPITAL | Age: 54
Discharge: HOME | End: 2024-08-02
Payer: COMMERCIAL

## 2024-07-30 VITALS
TEMPERATURE: 98 F | HEART RATE: 78 BPM | SYSTOLIC BLOOD PRESSURE: 184 MMHG | WEIGHT: 203 LBS | DIASTOLIC BLOOD PRESSURE: 97 MMHG | RESPIRATION RATE: 16 BRPM | BODY MASS INDEX: 28.42 KG/M2 | HEIGHT: 71 IN | OXYGEN SATURATION: 100 %

## 2024-07-30 DIAGNOSIS — M48.02 CERVICAL SPINAL STENOSIS: ICD-10-CM

## 2024-07-30 DIAGNOSIS — M54.2 NECK PAIN: Primary | ICD-10-CM

## 2024-07-30 DIAGNOSIS — I10 HYPERTENSION, UNSPECIFIED TYPE: ICD-10-CM

## 2024-07-30 DIAGNOSIS — G93.89 ENCEPHALOMALACIA: ICD-10-CM

## 2024-07-30 PROCEDURE — 72125 CT NECK SPINE W/O DYE: CPT

## 2024-07-30 PROCEDURE — 70450 CT HEAD/BRAIN W/O DYE: CPT

## 2024-07-30 PROCEDURE — 99284 EMERGENCY DEPT VISIT MOD MDM: CPT

## 2024-07-30 PROCEDURE — 6370000000 HC RX 637 (ALT 250 FOR IP): Performed by: EMERGENCY MEDICINE

## 2024-07-30 RX ORDER — METHOCARBAMOL 500 MG/1
500 TABLET, FILM COATED ORAL 4 TIMES DAILY
Qty: 40 TABLET | Refills: 0 | Status: SHIPPED | OUTPATIENT
Start: 2024-07-30 | End: 2024-08-09

## 2024-07-30 RX ORDER — HYDROCODONE BITARTRATE AND ACETAMINOPHEN 5; 325 MG/1; MG/1
1 TABLET ORAL
Status: COMPLETED | OUTPATIENT
Start: 2024-07-30 | End: 2024-07-30

## 2024-07-30 RX ORDER — METHOCARBAMOL 500 MG/1
1000 TABLET, FILM COATED ORAL
Status: COMPLETED | OUTPATIENT
Start: 2024-07-30 | End: 2024-07-30

## 2024-07-30 RX ORDER — LIDOCAINE 4 G/G
1 PATCH TOPICAL
Status: DISCONTINUED | OUTPATIENT
Start: 2024-07-30 | End: 2024-07-30 | Stop reason: HOSPADM

## 2024-07-30 RX ADMIN — METHOCARBAMOL 1000 MG: 500 TABLET ORAL at 03:49

## 2024-07-30 RX ADMIN — HYDROCODONE BITARTRATE AND ACETAMINOPHEN 1 TABLET: 5; 325 TABLET ORAL at 05:13

## 2024-07-30 ASSESSMENT — PAIN - FUNCTIONAL ASSESSMENT: PAIN_FUNCTIONAL_ASSESSMENT: 0-10

## 2024-07-30 ASSESSMENT — PAIN DESCRIPTION - DESCRIPTORS
DESCRIPTORS: SHOOTING;PRESSURE
DESCRIPTORS: SHARP;SHOOTING
DESCRIPTORS: SORE;TENDER

## 2024-07-30 ASSESSMENT — PAIN DESCRIPTION - LOCATION
LOCATION: NECK

## 2024-07-30 ASSESSMENT — PAIN DESCRIPTION - FREQUENCY: FREQUENCY: CONTINUOUS

## 2024-07-30 ASSESSMENT — PAIN DESCRIPTION - ORIENTATION
ORIENTATION: LEFT;MID
ORIENTATION: LEFT

## 2024-07-30 ASSESSMENT — PAIN SCALES - GENERAL
PAINLEVEL_OUTOF10: 8
PAINLEVEL_OUTOF10: 7
PAINLEVEL_OUTOF10: 10
PAINLEVEL_OUTOF10: 10

## 2024-07-30 ASSESSMENT — LIFESTYLE VARIABLES
HOW OFTEN DO YOU HAVE A DRINK CONTAINING ALCOHOL: 2-4 TIMES A MONTH
HOW MANY STANDARD DRINKS CONTAINING ALCOHOL DO YOU HAVE ON A TYPICAL DAY: 1 OR 2

## 2024-07-30 NOTE — ED PROVIDER NOTES
[facility] EMERGENCY DEPT  EMERGENCY DEPARTMENT ENCOUNTER    Patient Name: Benjamin Alcocer  MRN: [id number]  YOB: 1970  Provider: Marc Francis MD  PCP: Kristofer Delgado DO   Time/Date of evaluation: 3:30 AM EDT on 7/30/24    History of Presenting Illness     History Provided by: Patient  History is limited by: Nothing     HISTORY:   Benjamin Alcocer is a 53 y.o. male presenting with neck pain primarily on the left side of his neck.  He says this started 2 days ago after he slept on the couch.  He denies any trauma to the head or neck.  He has had this happen before and has been told he has bulging disks in his neck.  He has been trying Tylenol at home with normal relief.  No shooting pains or numbness tingling or weakness in his arms.    Nursing Notes were all reviewed and agreed with or any disagreements were addressed in the HPI.    Past History     PAST MEDICAL HISTORY:  Past Medical History:   Diagnosis Date    Congestive heart failure, unspecified     chronic    Diabetes (HCC)     Dyspnea on exertion     Heart failure (HCC)     Hyperglycemia     Hypertension     moderately severe    Renal insufficiency        PAST SURGICAL HISTORY:  Past Surgical History:   Procedure Laterality Date    CARDIAC PROCEDURE N/A 1/15/2024    Left and right heart cath / coronary angiography performed by Kumar Kovacs MD at [facility] CARDIAC CATH LAB       FAMILY HISTORY:  Family History   Problem Relation Age of Onset    Heart Disease Father     Diabetes Mother     Hypertension Other         family history - nos       SOCIAL HISTORY:  Social History     Tobacco Use    Smoking status: Every Day     Current packs/day: 0.25     Types: Cigarettes   Substance Use Topics    Alcohol use: Yes       MEDICATIONS:  No current facility-administered medications for this encounter.     Current Outpatient Medications   Medication Sig Dispense Refill    methocarbamol (ROBAXIN) 500 MG tablet Take 1 tablet by mouth 4 times daily for 10

## 2024-07-30 NOTE — DISCHARGE INSTRUCTIONS
Thank you for visiting department today.  As we discussed your CT scan of the head did have some incidental findings.  I recommend following up with a neurologist about this.  Your CT scan did show stenosis in her cervical spine.  I recommend following up with a spine specialist.  If you have any worsening symptoms please return to the emergency department.

## (undated) DEVICE — BAND COMPR L24CM REG CLR PLAS HEMSTAT EXT HK AND LOOP RETEN

## (undated) DEVICE — PACK PROCEDURE SURG CATH CUST

## (undated) DEVICE — SENSOR PLSE OXMTR AD CBL L36IN ADH FRM FIT SPO2 DISP

## (undated) DEVICE — DRAPE,ANGIO,BRACH,STERILE,38X44: Brand: MEDLINE

## (undated) DEVICE — STOPCOCK TRNSDUC 500PSI 3 W ROT M LUER LT BLU OFF HNDL R

## (undated) DEVICE — SPLINT WR VELC FOAM NEUT POS DISP FOR RAD ART ACC SFT STRP

## (undated) DEVICE — DRAPE EP LT SUBCLAV ENTRY SHLD SORBX

## (undated) DEVICE — GLIDESHEATH SLENDER STAINLESS STEEL KIT: Brand: GLIDESHEATH SLENDER

## (undated) DEVICE — GUIDEWIRE VASC L260CM DIA0.025IN TIP L7CM DIA3MM STD PTFE J

## (undated) DEVICE — SWAN-GANZ POLYMER BLEND TRUE SIZE C-TIP CONTROLCATH TD CATHETER: Brand: SWAN-GANZ CONTROLCATH TRUE SIZE

## (undated) DEVICE — CATHETER DIAG 5FR L100CM LUMN ID0.047IN JR4 CRV 0 SIDE H

## (undated) DEVICE — TORCON NB ADVANTAGE CATHETER: Brand: TORCON NB

## (undated) DEVICE — GUIDEWIRE VASC L260CM DIA0.035IN TIP L3MM STD EXCHG PTFE J